# Patient Record
Sex: MALE | Race: WHITE | Employment: OTHER | URBAN - METROPOLITAN AREA
[De-identification: names, ages, dates, MRNs, and addresses within clinical notes are randomized per-mention and may not be internally consistent; named-entity substitution may affect disease eponyms.]

---

## 2018-06-04 ENCOUNTER — TELEPHONE (OUTPATIENT)
Dept: FAMILY MEDICINE CLINIC | Age: 73
End: 2018-06-04

## 2018-06-04 DIAGNOSIS — G47.33 OSA (OBSTRUCTIVE SLEEP APNEA): Primary | ICD-10-CM

## 2018-06-28 ENCOUNTER — TELEPHONE (OUTPATIENT)
Dept: FAMILY MEDICINE CLINIC | Age: 73
End: 2018-06-28

## 2018-07-02 DIAGNOSIS — G47.33 OSA (OBSTRUCTIVE SLEEP APNEA): ICD-10-CM

## 2018-08-14 ENCOUNTER — TELEPHONE (OUTPATIENT)
Dept: FAMILY MEDICINE CLINIC | Age: 73
End: 2018-08-14

## 2018-08-14 DIAGNOSIS — R73.09 ELEVATED GLUCOSE: ICD-10-CM

## 2018-08-14 DIAGNOSIS — E78.2 MIXED HYPERLIPIDEMIA: Primary | ICD-10-CM

## 2018-08-15 NOTE — TELEPHONE ENCOUNTER
Patient notified and verbalized understanding.   Faxed to [facility] per patient request.

## 2018-08-16 ENCOUNTER — TELEPHONE (OUTPATIENT)
Dept: FAMILY MEDICINE CLINIC | Age: 73
End: 2018-08-16

## 2018-08-16 DIAGNOSIS — R97.20 ELEVATED PSA: Primary | ICD-10-CM

## 2018-08-16 NOTE — TELEPHONE ENCOUNTER
Patient called again today about having his PSA checked with his blood work. I didn't see it in his chart.

## 2018-08-17 NOTE — TELEPHONE ENCOUNTER
Patient states it's been 6 months to a year. If you could add it he would appreciate it since he will be having labs done already.

## 2018-08-21 LAB
ALBUMIN SERPL-MCNC: 4 G/DL
ALP BLD-CCNC: 69 U/L
ALT SERPL-CCNC: 18 U/L
ANION GAP SERPL CALCULATED.3IONS-SCNC: 1.5 MMOL/L
AST SERPL-CCNC: 24 U/L
BASOPHILS ABSOLUTE: 0.1 /ΜL
BASOPHILS RELATIVE PERCENT: 1 %
BILIRUB SERPL-MCNC: 0.9 MG/DL (ref 0.1–1.4)
BUN BLDV-MCNC: 13 MG/DL
CALCIUM SERPL-MCNC: 9.1 MG/DL
CHLORIDE BLD-SCNC: 102 MMOL/L
CHOLESTEROL, TOTAL: 157 MG/DL
CHOLESTEROL/HDL RATIO: 2.6
CO2: 26 MMOL/L
CREAT SERPL-MCNC: 0.9 MG/DL
EOSINOPHILS ABSOLUTE: 0.3 /ΜL
EOSINOPHILS RELATIVE PERCENT: 5.5 %
GFR CALCULATED: NORMAL
GLUCOSE BLD-MCNC: 98 MG/DL
HCT VFR BLD CALC: 48.2 % (ref 41–53)
HDLC SERPL-MCNC: 40 MG/DL (ref 35–70)
HEMOGLOBIN: 16.2 G/DL (ref 13.5–17.5)
LDL CHOLESTEROL CALCULATED: 104 MG/DL (ref 0–160)
LYMPHOCYTES ABSOLUTE: 1.2 /ΜL
LYMPHOCYTES RELATIVE PERCENT: 21.4 %
MCH RBC QN AUTO: 29.2 PG
MCHC RBC AUTO-ENTMCNC: 33.5 G/DL
MCV RBC AUTO: 87 FL
MONOCYTES ABSOLUTE: 0.5 /ΜL
MONOCYTES RELATIVE PERCENT: 9.3 %
NEUTROPHILS ABSOLUTE: 3.6 /ΜL
NEUTROPHILS RELATIVE PERCENT: 62.8 %
PDW BLD-RTO: 13.9 %
PLATELET # BLD: 143 K/ΜL
PMV BLD AUTO: 8.3 FL
POTASSIUM SERPL-SCNC: 3.9 MMOL/L
RBC # BLD: 5.54 10^6/ΜL
SODIUM BLD-SCNC: 1.36 MMOL/L
TOTAL PROTEIN: 6.6
TRIGL SERPL-MCNC: 73 MG/DL
VLDLC SERPL CALC-MCNC: ABNORMAL MG/DL
WBC # BLD: 5.8 10^3/ML

## 2018-08-23 DIAGNOSIS — E78.2 MIXED HYPERLIPIDEMIA: ICD-10-CM

## 2018-08-23 DIAGNOSIS — R73.09 ELEVATED GLUCOSE: ICD-10-CM

## 2018-09-05 ENCOUNTER — OFFICE VISIT (OUTPATIENT)
Dept: FAMILY MEDICINE CLINIC | Age: 73
End: 2018-09-05
Payer: MEDICARE

## 2018-09-05 VITALS
BODY MASS INDEX: 23.91 KG/M2 | HEIGHT: 70 IN | OXYGEN SATURATION: 98 % | SYSTOLIC BLOOD PRESSURE: 95 MMHG | HEART RATE: 66 BPM | DIASTOLIC BLOOD PRESSURE: 56 MMHG | RESPIRATION RATE: 16 BRPM | WEIGHT: 167 LBS

## 2018-09-05 DIAGNOSIS — Z23 NEED FOR VACCINATION WITH 13-POLYVALENT PNEUMOCOCCAL CONJUGATE VACCINE: ICD-10-CM

## 2018-09-05 DIAGNOSIS — I15.9 SECONDARY HYPERTENSION: ICD-10-CM

## 2018-09-05 DIAGNOSIS — N40.1 BPH ASSOCIATED WITH NOCTURIA: ICD-10-CM

## 2018-09-05 DIAGNOSIS — Z00.00 MEDICARE ANNUAL WELLNESS VISIT, SUBSEQUENT: Primary | ICD-10-CM

## 2018-09-05 DIAGNOSIS — L40.9 PSORIASIS: ICD-10-CM

## 2018-09-05 DIAGNOSIS — D48.7 NEOPLASM OF UNCERTAIN BEHAVIOR OF BACK: ICD-10-CM

## 2018-09-05 DIAGNOSIS — R97.20 ELEVATED PSA: ICD-10-CM

## 2018-09-05 DIAGNOSIS — R35.1 BPH ASSOCIATED WITH NOCTURIA: ICD-10-CM

## 2018-09-05 DIAGNOSIS — R15.9 INCONTINENCE OF FECES, UNSPECIFIED FECAL INCONTINENCE TYPE: ICD-10-CM

## 2018-09-05 DIAGNOSIS — Z00.00 ROUTINE GENERAL MEDICAL EXAMINATION AT A HEALTH CARE FACILITY: ICD-10-CM

## 2018-09-05 DIAGNOSIS — Z23 NEED FOR VACCINATION FOR ZOSTER: ICD-10-CM

## 2018-09-05 PROCEDURE — G0438 PPPS, INITIAL VISIT: HCPCS | Performed by: FAMILY MEDICINE

## 2018-09-05 PROCEDURE — 4040F PNEUMOC VAC/ADMIN/RCVD: CPT | Performed by: FAMILY MEDICINE

## 2018-09-05 PROCEDURE — G0009 ADMIN PNEUMOCOCCAL VACCINE: HCPCS | Performed by: FAMILY MEDICINE

## 2018-09-05 PROCEDURE — 90670 PCV13 VACCINE IM: CPT | Performed by: FAMILY MEDICINE

## 2018-09-05 RX ORDER — CLOBETASOL PROPIONATE 0.5 MG/G
CREAM TOPICAL
Qty: 45 G | Refills: 1 | Status: SHIPPED | OUTPATIENT
Start: 2018-09-05 | End: 2018-09-19

## 2018-09-05 RX ORDER — TAMSULOSIN HYDROCHLORIDE 0.4 MG/1
CAPSULE ORAL
COMMUNITY
Start: 2018-05-30 | End: 2020-08-26 | Stop reason: ALTCHOICE

## 2018-09-05 ASSESSMENT — PATIENT HEALTH QUESTIONNAIRE - PHQ9
SUM OF ALL RESPONSES TO PHQ QUESTIONS 1-9: 0
SUM OF ALL RESPONSES TO PHQ QUESTIONS 1-9: 0

## 2018-09-05 ASSESSMENT — ANXIETY QUESTIONNAIRES: GAD7 TOTAL SCORE: 0

## 2018-09-05 NOTE — PATIENT INSTRUCTIONS
Patient Education        Kegel Exercises: Care Instructions  Your Care Instructions    Kegel exercises strengthen muscles around the bladder. These muscles control the flow of urine. Kegel exercises are sometime called \"pelvic floor\" exercises. They can help prevent urine leakage and keep the pelvic organs in place. A woman who just had a baby might want to try Kegel exercises. They can strengthen pelvic muscles that have been weakened by pregnancy and childbirth. A man or woman may use Kegel exercises to treat urine leakage. You do Kegel exercises by tightening the muscles you use when you urinate. You will likely need to do these exercises for several weeks to get better. Follow-up care is a key part of your treatment and safety. Be sure to make and go to all appointments, and call your doctor if you are having problems. It's also a good idea to know your test results and keep a list of the medicines you take. How can you care for yourself at home? · Do Kegel exercises. ¨ Find the muscles you need to strengthen. To do this, tighten the muscles that stop your urine while you are going to the bathroom. These are the same muscles you squeeze during Kegel exercises. ¨ Squeeze the muscles as hard as you can. Your belly and thighs should not move. ¨ Hold the squeeze for 3 seconds. Then relax for 3 seconds. ¨ Start with 3 seconds, and then add 1 second each week until you are able to squeeze for 10 seconds. ¨ Repeat the exercise 10 to 15 times for each session. Do three or more sessions each day. · You can check to see if you are using the right muscles. Place a finger in your vagina and squeeze around it. You are doing them right when you feel pressure around your finger. Your doctor may also suggest that you put special weights in your vagina while you do the exercises. · Do not smoke. It can irritate the bladder. If you need help quitting, talk to your doctor about stop-smoking programs and medicines. every 1-2 years to screen for glaucoma; cataracts, macular degeneration, and other eye disorders. · A preventive dental visit is recommended every 6 months. · Try to get at least 150 minutes of exercise per week or 10,000 steps per day on a pedometer . · Order or download the FREE \"Exercise & Physical Activity: Your Everyday Guide\" from [employer]. Call 6-413.606.8493 or search [employer] online. · You need 8454-8532 mg of calcium and 3797-6005 IU of vitamin D per day. It is possible to meet your calcium requirement with diet alone, but a vitamin D supplement is usually necessary to meet this goal.  · When exposed to the sun, use a sunscreen that protects against both UVA and UVB radiation with an SPF of 30 or greater. Reapply every 2 to 3 hours or after sweating, drying off with a towel, or swimming. · Always wear a seat belt when traveling in a car. Always wear a helmet when riding a bicycle or motorcycle.

## 2018-09-05 NOTE — PROGRESS NOTES
Medicare Annual Wellness Visit  Name: Bree Nolan Date: 2018   MRN: <[id number]> Sex: Male   Age: 67 y.o. Ethnicity: Non-/Non    : 1945 Race: Meliza Irby is here for Annual Exam    Screenings for behavioral, psychosocial and functional/safety risks, and cognitive dysfunction are all negative except as indicated below. These results, as well as other patient data from the [address] form, are documented in Flowsheets linked to this Encounter. Interval history  Elevated PSA  No new issues  Being followed by urology, had negative biopsies x 2. Watchful waiting is being elected. Has ongoing urgency and very rare incontinence. Was started on Myrbetriq. Fecal incontinence  New complaint  Had 3 episodes in past 1 year. Admits to feeling of a \"looser\" sensation in his rectum since his last colonoscopy. BM's are typically formed, soft. No constipation or diarrhea. No blood  No abd pain    Hypertension:  Patient is here for follow up chronic hypertension. This is a Chronic problem. Present for  years  This is  generally controlled on current medication regimen. BP today is BP: (!) 95/56   Takes meds as directed and tolerates them well. Patient is  compliant with lifestyle modifications. Patient does not smoke. No Known Allergies  Prior to Visit Medications    Medication Sig Taking? Authorizing Provider   tamsulosin (FLOMAX) 0.4 MG capsule  Yes Historical Provider, MD   quinapril (ACCUPRIL) 40 MG tablet TAKE ONE TABLET BY MOUTH DAILY Yes Sera Santiago MD   Tadalafil (CIALIS) 2.5 MG TABS Take 1 tablet by mouth daily Yes Sera Santiago MD   Ginkgo Biloba 40 MG TABS Take  by mouth. Yes Historical Provider, MD   multivitamin [facility]) per tablet Take 1 tablet by mouth daily. Yes Historical Provider, MD IJM DICKEY by Does not apply route.  Yes Historical Provider, MD   Omega-3 Fatty Acids (FISH OIL) 1000 MG CAPS Take 2 Inject 0.5 mLs into the muscle once for 1 dose Repeat dose in 2 months    Incontinence of feces, unspecified fecal incontinence type  -     External Referral To General Surgery    Neoplasm of uncertain behavior of back  -     External Referral To Dermatology    Elevated PSA    BPH associated with nocturia    Secondary hypertension    Psoriasis  -     clobetasol (TEMOVATE) 0.05 % cream; Apply topically 2 times daily.

## 2018-09-10 ASSESSMENT — LIFESTYLE VARIABLES
HOW MANY STANDARD DRINKS CONTAINING ALCOHOL DO YOU HAVE ON A TYPICAL DAY: 0
HOW OFTEN DURING THE LAST YEAR HAVE YOU FOUND THAT YOU WERE NOT ABLE TO STOP DRINKING ONCE YOU HAD STARTED: 0
AUDIT-C TOTAL SCORE: 1
HOW OFTEN DO YOU HAVE A DRINK CONTAINING ALCOHOL: 1
HOW OFTEN DURING THE LAST YEAR HAVE YOU HAD A FEELING OF GUILT OR REMORSE AFTER DRINKING: 0
HOW OFTEN DURING THE LAST YEAR HAVE YOU BEEN UNABLE TO REMEMBER WHAT HAPPENED THE NIGHT BEFORE BECAUSE YOU HAD BEEN DRINKING: 0
HAS A RELATIVE, FRIEND, DOCTOR, OR ANOTHER HEALTH PROFESSIONAL EXPRESSED CONCERN ABOUT YOUR DRINKING OR SUGGESTED YOU CUT DOWN: 0
HOW OFTEN DURING THE LAST YEAR HAVE YOU NEEDED AN ALCOHOLIC DRINK FIRST THING IN THE MORNING TO GET YOURSELF GOING AFTER A NIGHT OF HEAVY DRINKING: 0
HOW OFTEN DO YOU HAVE SIX OR MORE DRINKS ON ONE OCCASION: 0
AUDIT TOTAL SCORE: 1
HOW OFTEN DURING THE LAST YEAR HAVE YOU FAILED TO DO WHAT WAS NORMALLY EXPECTED FROM YOU BECAUSE OF DRINKING: 0
HAVE YOU OR SOMEONE ELSE BEEN INJURED AS A RESULT OF YOUR DRINKING: 0

## 2018-10-31 ENCOUNTER — TELEPHONE (OUTPATIENT)
Dept: FAMILY MEDICINE CLINIC | Age: 73
End: 2018-10-31

## 2018-10-31 DIAGNOSIS — R10.9 FLANK PAIN: Primary | ICD-10-CM

## 2018-11-01 LAB — MICROSCOPIC URINE: NORMAL

## 2018-11-02 DIAGNOSIS — R10.9 FLANK PAIN: ICD-10-CM

## 2018-11-05 ENCOUNTER — OFFICE VISIT (OUTPATIENT)
Dept: FAMILY MEDICINE CLINIC | Age: 73
End: 2018-11-05
Payer: MEDICARE

## 2018-11-05 VITALS
BODY MASS INDEX: 23.62 KG/M2 | DIASTOLIC BLOOD PRESSURE: 80 MMHG | WEIGHT: 165 LBS | RESPIRATION RATE: 16 BRPM | HEIGHT: 70 IN | HEART RATE: 66 BPM | SYSTOLIC BLOOD PRESSURE: 129 MMHG | OXYGEN SATURATION: 97 %

## 2018-11-05 DIAGNOSIS — R10.9 FLANK PAIN: Primary | ICD-10-CM

## 2018-11-05 PROCEDURE — G8427 DOCREV CUR MEDS BY ELIG CLIN: HCPCS | Performed by: FAMILY MEDICINE

## 2018-11-05 PROCEDURE — 3017F COLORECTAL CA SCREEN DOC REV: CPT | Performed by: FAMILY MEDICINE

## 2018-11-05 PROCEDURE — G8484 FLU IMMUNIZE NO ADMIN: HCPCS | Performed by: FAMILY MEDICINE

## 2018-11-05 PROCEDURE — 4040F PNEUMOC VAC/ADMIN/RCVD: CPT | Performed by: FAMILY MEDICINE

## 2018-11-05 PROCEDURE — 1101F PT FALLS ASSESS-DOCD LE1/YR: CPT | Performed by: FAMILY MEDICINE

## 2018-11-05 PROCEDURE — 1123F ACP DISCUSS/DSCN MKR DOCD: CPT | Performed by: FAMILY MEDICINE

## 2018-11-05 PROCEDURE — 1036F TOBACCO NON-USER: CPT | Performed by: FAMILY MEDICINE

## 2018-11-05 PROCEDURE — G8420 CALC BMI NORM PARAMETERS: HCPCS | Performed by: FAMILY MEDICINE

## 2018-11-05 PROCEDURE — 99213 OFFICE O/P EST LOW 20 MIN: CPT | Performed by: FAMILY MEDICINE

## 2018-11-05 ASSESSMENT — ENCOUNTER SYMPTOMS
CONSTIPATION: 0
VOMITING: 0
SHORTNESS OF BREATH: 0
ABDOMINAL PAIN: 0
DIARRHEA: 0
BLOOD IN STOOL: 0
COUGH: 0
NAUSEA: 0

## 2018-11-05 NOTE — PROGRESS NOTES
Subjective:    Foster Jordan is here today for left sided flank pain for the past 7-10 days. It was worse on Friday and today it is 1/10. He has had similar pain in the past but this is a little different. He had a U[facility] done at the [facility] and it was WNL. His pain is significantly better than it was. ROS: Otherwise negative    Patient Active Problem List   Diagnosis    Elevated PSA    Mitral valve disorder    BPH associated with nocturia    Hypertension       Past medical, surgical, family and social history were reviewed, non-contributory, and unchanged unless otherwise stated. Objective:    /80   Pulse 66   Resp 16   Ht 5' 10\" (1.778 m)   Wt 165 lb (74.8 kg)   SpO2 97%   BMI 23.68 kg/m²     Exam is as noted by resident with the following changes, additions or corrections:      Assessment/Plan:        Foster Jordan was seen today for flank pain. Diagnoses and all orders for this visit:    Flank pain           Attending Physician Statement    I have reviewed the chart, including any radiology or labs. I have discussed the case, including pertinent history and exam findings with the resident. I agree with the assessment, plan and orders as documented by the resident. Please refer to the resident note for additional information.       Electronically signed by Pritesh Rodriguez DO on 11/6/2018 at 5:28 PM
Cigarettes     Quit date: 1/1/1980    Smokeless tobacco: Never Used    Alcohol use No       Immunization History   Administered Date(s) Administered    Influenza, Triv, 3 Years and older, IM, PF (Afluria 5yrs and older) 10/15/2016    Pneumococcal 13-valent Conjugate ([id number]) 09/05/2018    Pneumococcal Polysaccharide ([id number]) 03/05/2013    Zoster Live (Zostavax) 09/04/2012       Review of Systems   Constitutional: Negative for chills and fever. Respiratory: Negative for cough and shortness of breath. Cardiovascular: Negative for chest pain, palpitations and leg swelling. Gastrointestinal: Negative for abdominal pain, blood in stool, constipation, diarrhea, nausea and vomiting. Genitourinary: Negative for dysuria and hematuria. Neurological: Negative for dizziness and headaches. VS:  /80   Pulse 66   Resp 16   Ht 5' 10\" (1.778 m)   Wt 165 lb (74.8 kg)   SpO2 97%   BMI 23.68 kg/m²     Physical Exam   Constitutional: He is oriented to person, place, and time. He appears well-developed and well-nourished. HENT:   Head: Normocephalic and atraumatic. Cardiovascular: Normal rate and regular rhythm. No murmur heard. Pulmonary/Chest: Effort normal and breath sounds normal. No respiratory distress. He has no wheezes. Abdominal: Soft. Normal appearance and bowel sounds are normal. He exhibits no distension. There is no hepatosplenomegaly. There is no tenderness. Musculoskeletal: Normal range of motion. No CVA tenderness. No spinal tenderness. No paraspinal tenderness. Neurological: He is alert and oriented to person, place, and time. Skin: Skin is warm and dry. Assessment/Plan:  1. Flank pain  Significantly improved. Patient has no history of renal calculi and denies passing any stones. Urinary work up has been fine. Unclear etiology, most likely muscular sprain. If reoccurs, consider imaging at that time. Provided reassurance.     Follow up: prn with

## 2019-01-14 DIAGNOSIS — I10 ESSENTIAL HYPERTENSION: ICD-10-CM

## 2019-01-14 RX ORDER — QUINAPRIL 40 MG/1
TABLET ORAL
Qty: 90 TABLET | Refills: 3 | Status: SHIPPED
Start: 2019-01-14 | End: 2020-11-04

## 2019-01-22 ENCOUNTER — OFFICE VISIT (OUTPATIENT)
Dept: FAMILY MEDICINE CLINIC | Age: 74
End: 2019-01-22
Payer: MEDICARE

## 2019-01-22 VITALS
RESPIRATION RATE: 16 BRPM | WEIGHT: 167 LBS | SYSTOLIC BLOOD PRESSURE: 122 MMHG | HEIGHT: 70 IN | DIASTOLIC BLOOD PRESSURE: 77 MMHG | OXYGEN SATURATION: 96 % | HEART RATE: 88 BPM | BODY MASS INDEX: 23.91 KG/M2

## 2019-01-22 DIAGNOSIS — J98.4 RESTRICTIVE LUNG DISEASE: ICD-10-CM

## 2019-01-22 DIAGNOSIS — R06.2 WHEEZING: Primary | ICD-10-CM

## 2019-01-22 LAB
EXPIRATORY TIME: NORMAL SEC
FEF 25-75% %PRED-PRE: NORMAL L/SEC
FEF 25-75% PRED: NORMAL L/SEC
FEF 25-75%-PRE: NORMAL L/SEC
FEV1 %PRED-PRE: NORMAL %
FEV1 PRED: NORMAL L
FEV1/FVC %PRED-PRE: NORMAL %
FEV1/FVC PRED: NORMAL %
FEV1/FVC: 99 %
FEV1: NORMAL L
FVC %PRED-PRE: NORMAL %
FVC PRED: NORMAL L
FVC: NORMAL L
PEF %PRED-PRE: NORMAL L/SEC
PEF PRED: NORMAL L/SEC
PEF-PRE: NORMAL L/SEC

## 2019-01-22 PROCEDURE — 99214 OFFICE O/P EST MOD 30 MIN: CPT | Performed by: FAMILY MEDICINE

## 2019-01-22 PROCEDURE — 94010 BREATHING CAPACITY TEST: CPT | Performed by: FAMILY MEDICINE

## 2019-01-22 PROCEDURE — G8420 CALC BMI NORM PARAMETERS: HCPCS | Performed by: FAMILY MEDICINE

## 2019-01-22 PROCEDURE — G8427 DOCREV CUR MEDS BY ELIG CLIN: HCPCS | Performed by: FAMILY MEDICINE

## 2019-01-22 PROCEDURE — 1123F ACP DISCUSS/DSCN MKR DOCD: CPT | Performed by: FAMILY MEDICINE

## 2019-01-22 PROCEDURE — 3017F COLORECTAL CA SCREEN DOC REV: CPT | Performed by: FAMILY MEDICINE

## 2019-01-22 PROCEDURE — G8484 FLU IMMUNIZE NO ADMIN: HCPCS | Performed by: FAMILY MEDICINE

## 2019-01-22 PROCEDURE — 1101F PT FALLS ASSESS-DOCD LE1/YR: CPT | Performed by: FAMILY MEDICINE

## 2019-01-22 PROCEDURE — 4040F PNEUMOC VAC/ADMIN/RCVD: CPT | Performed by: FAMILY MEDICINE

## 2019-01-22 PROCEDURE — 1036F TOBACCO NON-USER: CPT | Performed by: FAMILY MEDICINE

## 2019-01-22 RX ORDER — ALBUTEROL SULFATE 90 UG/1
2 AEROSOL, METERED RESPIRATORY (INHALATION) EVERY 6 HOURS PRN
Qty: 1 INHALER | Refills: 5 | Status: SHIPPED | OUTPATIENT
Start: 2019-01-22 | End: 2019-09-11 | Stop reason: SDUPTHER

## 2019-01-22 RX ORDER — BUDESONIDE AND FORMOTEROL FUMARATE DIHYDRATE 80; 4.5 UG/1; UG/1
2 AEROSOL RESPIRATORY (INHALATION) 2 TIMES DAILY
Qty: 1 INHALER | Refills: 5 | Status: SHIPPED | OUTPATIENT
Start: 2019-01-22 | End: 2019-09-11 | Stop reason: SDUPTHER

## 2019-01-22 ASSESSMENT — ENCOUNTER SYMPTOMS
TROUBLE SWALLOWING: 0
VOICE CHANGE: 0
ABDOMINAL PAIN: 0

## 2019-01-22 ASSESSMENT — PULMONARY FUNCTION TESTS: FEV1/FVC: 99

## 2019-02-01 DIAGNOSIS — R06.2 WHEEZING: ICD-10-CM

## 2019-02-01 PROCEDURE — 71046 X-RAY EXAM CHEST 2 VIEWS: CPT | Performed by: FAMILY MEDICINE

## 2019-02-11 LAB
PROSTATE SPECIFIC ANTIGEN FREE: 0.75 NG/ML
PROSTATE SPECIFIC ANTIGEN PERCENT FREE: 9 %
PROSTATE SPECIFIC ANTIGEN: 8.3 NG/ML
SODIUM BLD-SCNC: NORMAL MMOL/L

## 2019-09-11 ENCOUNTER — OFFICE VISIT (OUTPATIENT)
Dept: FAMILY MEDICINE CLINIC | Age: 74
End: 2019-09-11
Payer: MEDICARE

## 2019-09-11 VITALS
HEART RATE: 67 BPM | RESPIRATION RATE: 16 BRPM | SYSTOLIC BLOOD PRESSURE: 105 MMHG | OXYGEN SATURATION: 97 % | DIASTOLIC BLOOD PRESSURE: 64 MMHG | HEIGHT: 70 IN | BODY MASS INDEX: 24.34 KG/M2 | WEIGHT: 170 LBS

## 2019-09-11 DIAGNOSIS — J98.4 RESTRICTIVE LUNG DISEASE: ICD-10-CM

## 2019-09-11 DIAGNOSIS — R06.2 WHEEZING: ICD-10-CM

## 2019-09-11 DIAGNOSIS — Z00.00 ROUTINE GENERAL MEDICAL EXAMINATION AT A HEALTH CARE FACILITY: ICD-10-CM

## 2019-09-11 DIAGNOSIS — J45.909 REACTIVE AIRWAY DISEASE WITHOUT COMPLICATION, UNSPECIFIED ASTHMA SEVERITY, UNSPECIFIED WHETHER PERSISTENT: ICD-10-CM

## 2019-09-11 DIAGNOSIS — Z13.6 SCREENING FOR AAA (ABDOMINAL AORTIC ANEURYSM): ICD-10-CM

## 2019-09-11 DIAGNOSIS — I10 ESSENTIAL HYPERTENSION: ICD-10-CM

## 2019-09-11 DIAGNOSIS — Z00.00 MEDICARE ANNUAL WELLNESS VISIT, SUBSEQUENT: Primary | ICD-10-CM

## 2019-09-11 PROCEDURE — 3017F COLORECTAL CA SCREEN DOC REV: CPT | Performed by: FAMILY MEDICINE

## 2019-09-11 PROCEDURE — G0439 PPPS, SUBSEQ VISIT: HCPCS | Performed by: FAMILY MEDICINE

## 2019-09-11 PROCEDURE — 4040F PNEUMOC VAC/ADMIN/RCVD: CPT | Performed by: FAMILY MEDICINE

## 2019-09-11 PROCEDURE — 1123F ACP DISCUSS/DSCN MKR DOCD: CPT | Performed by: FAMILY MEDICINE

## 2019-09-11 RX ORDER — IBUPROFEN 400 MG/1
TABLET ORAL
COMMUNITY
End: 2020-08-26 | Stop reason: ALTCHOICE

## 2019-09-11 RX ORDER — ASPIRIN 81 MG
TABLET, DELAYED RELEASE (ENTERIC COATED) ORAL
COMMUNITY

## 2019-09-11 RX ORDER — ALBUTEROL SULFATE 90 UG/1
2 AEROSOL, METERED RESPIRATORY (INHALATION) EVERY 6 HOURS PRN
Qty: 1 INHALER | Refills: 5 | Status: SHIPPED
Start: 2019-09-11 | End: 2020-08-26 | Stop reason: ALTCHOICE

## 2019-09-11 RX ORDER — BUDESONIDE AND FORMOTEROL FUMARATE DIHYDRATE 80; 4.5 UG/1; UG/1
2 AEROSOL RESPIRATORY (INHALATION) 2 TIMES DAILY
Qty: 1 INHALER | Refills: 5 | Status: SHIPPED
Start: 2019-09-11 | End: 2020-10-01

## 2019-09-11 ASSESSMENT — LIFESTYLE VARIABLES
AUDIT-C TOTAL SCORE: 1
HOW OFTEN DURING THE LAST YEAR HAVE YOU FOUND THAT YOU WERE NOT ABLE TO STOP DRINKING ONCE YOU HAD STARTED: 0
HOW OFTEN DURING THE LAST YEAR HAVE YOU HAD A FEELING OF GUILT OR REMORSE AFTER DRINKING: 0
HAVE YOU OR SOMEONE ELSE BEEN INJURED AS A RESULT OF YOUR DRINKING: 0
HAS A RELATIVE, FRIEND, DOCTOR, OR ANOTHER HEALTH PROFESSIONAL EXPRESSED CONCERN ABOUT YOUR DRINKING OR SUGGESTED YOU CUT DOWN: 0
HOW MANY STANDARD DRINKS CONTAINING ALCOHOL DO YOU HAVE ON A TYPICAL DAY: 0
HOW OFTEN DURING THE LAST YEAR HAVE YOU NEEDED AN ALCOHOLIC DRINK FIRST THING IN THE MORNING TO GET YOURSELF GOING AFTER A NIGHT OF HEAVY DRINKING: 0
HOW OFTEN DO YOU HAVE A DRINK CONTAINING ALCOHOL: 1
HOW OFTEN DO YOU HAVE SIX OR MORE DRINKS ON ONE OCCASION: 0
AUDIT TOTAL SCORE: 1
HOW OFTEN DURING THE LAST YEAR HAVE YOU BEEN UNABLE TO REMEMBER WHAT HAPPENED THE NIGHT BEFORE BECAUSE YOU HAD BEEN DRINKING: 0
HOW OFTEN DURING THE LAST YEAR HAVE YOU FAILED TO DO WHAT WAS NORMALLY EXPECTED FROM YOU BECAUSE OF DRINKING: 0

## 2019-09-11 ASSESSMENT — PATIENT HEALTH QUESTIONNAIRE - PHQ9
SUM OF ALL RESPONSES TO PHQ QUESTIONS 1-9: 0
SUM OF ALL RESPONSES TO PHQ QUESTIONS 1-9: 0

## 2019-09-11 NOTE — PATIENT INSTRUCTIONS
Personalized Preventive Plan for Shea Crespo - 9/11/2019  Medicare offers a range of preventive health benefits. Some of the tests and screenings are paid in full while other may be subject to a deductible, co-insurance, and/or copay. Some of these benefits include a comprehensive review of your medical history including lifestyle, illnesses that may run in your family, and various assessments and screenings as appropriate. After reviewing your medical record and screening and assessments performed today your provider may have ordered immunizations, labs, imaging, and/or referrals for you. A list of these orders (if applicable) as well as your Preventive Care list are included within your After Visit Summary for your review. Other Preventive Recommendations:    · A preventive eye exam performed by an eye specialist is recommended every 1-2 years to screen for glaucoma; cataracts, macular degeneration, and other eye disorders. · A preventive dental visit is recommended every 6 months. · Try to get at least 150 minutes of exercise per week or 10,000 steps per day on a pedometer . · Order or download the FREE \"Exercise & Physical Activity: Your Everyday Guide\" from [employer]. Call 0-249.240.6584 or search [employer] [facility] [employer] [facility] [employer] online. · You need 4601-4136 mg of calcium and 1623-9813 IU of vitamin D per day. It is possible to meet your calcium requirement with diet alone, but a vitamin D supplement is usually necessary to meet this goal.  · When exposed to the sun, use a sunscreen that protects against both UVA and UVB radiation with an SPF of 30 or greater. Reapply every 2 to 3 hours or after sweating, drying off with a towel, or swimming. · Always wear a seat belt when traveling in a car. Always wear a helmet when riding a bicycle or motorcycle. Heart-Healthy Diet   Sodium, Fat, and Cholesterol Controlled Diet       What Is a Heart Healthy Diet?    A heart-healthy example, this would mean 60 grams of fat or less per day. Saturated fat and trans fat in your diet raises your blood cholesterol the most, much more than dietary cholesterol does. For this reason, on a heart-healthy diet, less than 7% of your calories should come from saturated fat and ideally 0% from trans fat. On an 1800-calorie diet, this translates into less than 14 grams of saturated fat per day, leaving 46 grams of fat to come from mono- and polyunsaturated fats.    Food Choices on a Heart Healthy Diet   Food Category   Foods Recommended   Foods to Avoid   Grains   Breads and rolls without salted tops Most dry and cooked cereals Unsalted crackers and breadsticks Low-sodium or homemade breadcrumbs or stuffing All rice and pastas   Breads, rolls, and crackers with salted tops High-fat baked goods (eg, muffins, donuts, pastries) Quick breads, self-rising flour, and biscuit mixes Regular bread crumbs Instant hot cereals Commercially prepared rice, pasta, or stuffing mixes   Vegetables   Most fresh, frozen, and low-sodium canned vegetables Low-sodium and salt-free vegetable juices Canned vegetables if unsalted or rinsed   Regular canned vegetables and juices, including sauerkraut and pickled vegetables Frozen vegetables with sauces Commercially prepared potato and vegetable mixes   Fruits   Most fresh, frozen, and canned fruits All fruit juices   Fruits processed with salt or sodium   Milk   Nonfat or low-fat (1%) milk Nonfat or low-fat yogurt Cottage cheese, low-fat ricotta, cheeses labeled as low-fat and low-sodium   Whole milk Reduced-fat (2%) milk Malted and chocolate milk Full fat yogurt Most cheeses (unless low-fat and low salt) Buttermilk (no more than 1 cup per week)   Meats and Beans   Lean cuts of fresh or frozen beef, veal, lamb, or pork (look for the word loin) Fresh or frozen poultry without the skin Fresh or frozen fish and some shellfish Egg whites and egg substitutes (Limit whole eggs to three per week) Tofu Nuts or seeds (unsalted, dry-roasted), low-sodium peanut butter Dried peas, beans, and lentils   Any smoked, cured, salted, or canned meat, fish, or poultry (including burton, chipped beef, cold cuts, hot dogs, sausages, sardines, and anchovies) Poultry skins Breaded and/or fried fish or meats Canned peas, beans, and lentils Salted nuts   Fats and Oils   Olive oil and canola oil Low-sodium, low-fat salad dressings and mayonnaise   Butter, margarine, coconut and palm oils, burton fat   Snacks, Sweets, and Condiments   Low-sodium or unsalted versions of broths, soups, soy sauce, and condiments Pepper, herbs, and spices; vinegar, lemon, or lime juice Low-fat frozen desserts (yogurt, sherbet, fruit bars) Sugar, cocoa powder, honey, syrup, jam, and preserves Low-fat, trans-fat free cookies, cakes, and pies Phu and animal crackers, fig bars, claudia snaps   High-fat desserts Broth, soups, gravies, and sauces, made from instant mixes or other high-sodium ingredients Salted snack foods Canned olives Meat tenderizers, seasoning salt, and most flavored vinegars   Beverages   Low-sodium carbonated beverages Tea and coffee in moderation Soy milk   Commercially softened water   Suggestions   Make whole grains, fruits, and vegetables the base of your diet. Choose heart-healthy fats such as canola, olive, and flaxseed oil, and foods high in heart-healthy fats, such as nuts, seeds, soybeans, tofu, and fish. Eat fish at least twice per week; the fish highest in omega-3 fatty acids and lowest in mercury include salmon, herring, mackerel, sardines, and canned chunk light tuna. If you eat fish less than twice per week or have high triglycerides, talk to your doctor about taking fish oil supplements. Read food labels.    For products low in fat and cholesterol, look for fat free, low-fat, cholesterol free, saturated fat free, and trans fat freeAlso scan the Nutrition Facts Label, which lists saturated fat, trans fat,

## 2019-09-11 NOTE — PROGRESS NOTES
exchange without accessory muscle use   No rales, rhonchi or wheeze  CV: S1S2 RRR   No murmur   Intact distal pulses   No edema  GI: Abdomen Soft, non tender, non distended   Normoactive bowel sounds   No palpable hepatosplenomegaly  MS: Physiologic ROM of all extremities    Intact distal pulses   No clubbing or cyanosis   Skin Warm and dry; no rash or lesion  Neuro: Alert and oriented; normal and intact dtr's   Psych: Euthymic mood, congruent affect       Patient's complete Health Risk Assessment and screening values have been reviewed and are found in Flowsheets. The following problems were reviewed today and where indicated follow up appointments were made and/or referrals ordered.     Positive Risk Factor Screenings with Interventions:     Hearing/Vision:  No exam data present  Hearing/Vision  Do you or your family notice any trouble with your hearing?: No  Do you have difficulty driving, watching TV, or doing any of your daily activities because of your eyesight?: No  Have you had an eye exam within the past year?: (!) No  Hearing/Vision Interventions:  · Vision concerns:  patient encouraged to make appointment with his/her eye specialist    Safety:  Safety  Do you have working smoke detectors?: Yes  Have all throw rugs been removed or fastened?: (!) No  Do you have non-slip mats or surfaces in all bathtubs/showers?: Yes  Do all of your stairways have a railing or banister?: Yes  Are your doorways, halls and stairs free of clutter?: Yes  Do you always fasten your seatbelt when you are in a car?: Yes  Safety Interventions:  · Home safety tips provided    Personalized Preventive Plan   Current Health Maintenance Status  Immunization History   Administered Date(s) Administered    Influenza, Triv, 3 Years and older, IM, PF (Afluria 5yrs and older) 10/15/2016    Pneumococcal Conjugate 13-valent (Latrelle Deo) 09/05/2018    Pneumococcal Polysaccharide ([id number]) 03/05/2013    Zoster Live (Zostavax) 09/04/2012 Health Maintenance   Topic Date Due    AAA screen  1945    Hepatitis C screen  1945    DTaP/Tdap/Td vaccine (1 - Tdap) 11/07/1964    Shingles Vaccine (2 of 3) 10/30/2012    Potassium monitoring  08/21/2019    Creatinine monitoring  08/21/2019    Flu vaccine (1) 09/01/2019    Annual Wellness Visit (AWV)  09/05/2019    Lipid screen  08/21/2023    Colon cancer screen colonoscopy  05/06/2029    Pneumococcal 65+ years Vaccine  Completed     Recommendations for Preventive Services Due: see orders and patient instructions/AVS.  . Recommended screening schedule for the next 5-10 years is provided to the patient in written form: see Patient Instructions/AVS.    There are no diagnoses linked to this encounter. Es Perez was seen today for medicare awv. Diagnoses and all orders for this visit:    Medicare annual wellness visit, subsequent    Wheezing  -     albuterol sulfate HFA (PROAIR HFA) 108 (90 Base) MCG/ACT inhaler; Inhale 2 puffs into the lungs every 6 hours as needed for Wheezing or Shortness of Breath  -     budesonide-formoterol (SYMBICORT) 80-4.5 MCG/ACT AERO; Inhale 2 puffs into the lungs 2 times daily Rinse mouth after use. Restrictive lung disease  -     albuterol sulfate HFA (PROAIR HFA) 108 (90 Base) MCG/ACT inhaler; Inhale 2 puffs into the lungs every 6 hours as needed for Wheezing or Shortness of Breath  -     budesonide-formoterol (SYMBICORT) 80-4.5 MCG/ACT AERO; Inhale 2 puffs into the lungs 2 times daily Rinse mouth after use. Reactive airway disease without complication, unspecified asthma severity, unspecified whether persistent    Essential hypertension  -     CBC Auto Differential; Future  -     Comprehensive Metabolic Panel; Future  -     Lipid Panel; Future    Screening for AAA (abdominal aortic aneurysm)  -     [place] screening for AAA; Future    Routine general medical examination at a health care facility    Plan  Overall issues are stable.   No new problems identified. Continue same meds and regimen  HM issues reviewed  See specialists as scheduled. Return in about 6 days (around 9/17/2019), or if symptoms worsen or fail to improve, for recheck on breathing and BP; see me for Medicare Annual Wellness Visit in 1 year.

## 2019-09-25 DIAGNOSIS — I10 ESSENTIAL HYPERTENSION: ICD-10-CM

## 2019-09-25 DIAGNOSIS — Z13.6 SCREENING FOR AAA (ABDOMINAL AORTIC ANEURYSM): ICD-10-CM

## 2019-09-25 LAB
ALBUMIN SERPL-MCNC: NORMAL G/DL
ALP BLD-CCNC: NORMAL U/L
ALT SERPL-CCNC: NORMAL U/L
ANION GAP SERPL CALCULATED.3IONS-SCNC: NORMAL MMOL/L
AST SERPL-CCNC: NORMAL U/L
BASOPHILS ABSOLUTE: NORMAL /ΜL
BASOPHILS RELATIVE PERCENT: NORMAL %
BILIRUB SERPL-MCNC: NORMAL MG/DL (ref 0.1–1.4)
BUN BLDV-MCNC: NORMAL MG/DL
CALCIUM SERPL-MCNC: NORMAL MG/DL
CHLORIDE BLD-SCNC: NORMAL MMOL/L
CHOLESTEROL, TOTAL: 161 MG/DL
CHOLESTEROL/HDL RATIO: 2.5
CO2: NORMAL MMOL/L
CREAT SERPL-MCNC: 1 MG/DL
EOSINOPHILS ABSOLUTE: NORMAL /ΜL
EOSINOPHILS RELATIVE PERCENT: NORMAL %
GFR CALCULATED: NORMAL
GLUCOSE BLD-MCNC: NORMAL MG/DL
HCT VFR BLD CALC: NORMAL % (ref 41–53)
HDLC SERPL-MCNC: 45 MG/DL (ref 35–70)
HEMOGLOBIN: NORMAL G/DL (ref 13.5–17.5)
LDL CHOLESTEROL CALCULATED: 112 MG/DL (ref 0–160)
LYMPHOCYTES ABSOLUTE: NORMAL /ΜL
LYMPHOCYTES RELATIVE PERCENT: NORMAL %
MCH RBC QN AUTO: NORMAL PG
MCHC RBC AUTO-ENTMCNC: NORMAL G/DL
MCV RBC AUTO: NORMAL FL
MONOCYTES ABSOLUTE: NORMAL /ΜL
MONOCYTES RELATIVE PERCENT: NORMAL %
NEUTROPHILS ABSOLUTE: NORMAL /ΜL
NEUTROPHILS RELATIVE PERCENT: NORMAL %
PDW BLD-RTO: NORMAL %
PLATELET # BLD: NORMAL K/ΜL
PMV BLD AUTO: NORMAL FL
POTASSIUM SERPL-SCNC: 3.8 MMOL/L
RBC # BLD: NORMAL 10^6/ΜL
SODIUM BLD-SCNC: NORMAL MMOL/L
TOTAL PROTEIN: NORMAL
TRIGL SERPL-MCNC: 84 MG/DL
VLDLC SERPL CALC-MCNC: NORMAL MG/DL
WBC # BLD: NORMAL 10^3/ML

## 2019-09-26 DIAGNOSIS — I10 ESSENTIAL HYPERTENSION: ICD-10-CM

## 2020-05-26 ENCOUNTER — OFFICE VISIT (OUTPATIENT)
Dept: FAMILY MEDICINE CLINIC | Age: 75
End: 2020-05-26
Payer: MEDICARE

## 2020-05-26 VITALS
TEMPERATURE: 98.8 F | SYSTOLIC BLOOD PRESSURE: 117 MMHG | RESPIRATION RATE: 16 BRPM | HEIGHT: 70 IN | HEART RATE: 60 BPM | DIASTOLIC BLOOD PRESSURE: 75 MMHG | BODY MASS INDEX: 24.05 KG/M2 | WEIGHT: 168 LBS | OXYGEN SATURATION: 97 %

## 2020-05-26 PROCEDURE — 3017F COLORECTAL CA SCREEN DOC REV: CPT | Performed by: FAMILY MEDICINE

## 2020-05-26 PROCEDURE — 99213 OFFICE O/P EST LOW 20 MIN: CPT | Performed by: FAMILY MEDICINE

## 2020-05-26 PROCEDURE — 1036F TOBACCO NON-USER: CPT | Performed by: FAMILY MEDICINE

## 2020-05-26 PROCEDURE — 4040F PNEUMOC VAC/ADMIN/RCVD: CPT | Performed by: FAMILY MEDICINE

## 2020-05-26 PROCEDURE — 1123F ACP DISCUSS/DSCN MKR DOCD: CPT | Performed by: FAMILY MEDICINE

## 2020-05-26 PROCEDURE — G8420 CALC BMI NORM PARAMETERS: HCPCS | Performed by: FAMILY MEDICINE

## 2020-05-26 PROCEDURE — G8427 DOCREV CUR MEDS BY ELIG CLIN: HCPCS | Performed by: FAMILY MEDICINE

## 2020-05-26 RX ORDER — OMEPRAZOLE 20 MG/1
20 CAPSULE, DELAYED RELEASE ORAL
Qty: 30 CAPSULE | Refills: 1 | Status: SHIPPED
Start: 2020-05-26 | End: 2020-08-18

## 2020-05-26 RX ORDER — FLUTICASONE PROPIONATE 50 MCG
1 SPRAY, SUSPENSION (ML) NASAL DAILY
Qty: 1 BOTTLE | Refills: 1 | Status: SHIPPED
Start: 2020-05-26 | End: 2022-07-20 | Stop reason: SDUPTHER

## 2020-05-26 ASSESSMENT — ENCOUNTER SYMPTOMS
SHORTNESS OF BREATH: 0
COUGH: 0
WHEEZING: 0

## 2020-05-26 ASSESSMENT — PATIENT HEALTH QUESTIONNAIRE - PHQ9
SUM OF ALL RESPONSES TO PHQ QUESTIONS 1-9: 0
1. LITTLE INTEREST OR PLEASURE IN DOING THINGS: 0
SUM OF ALL RESPONSES TO PHQ QUESTIONS 1-9: 0
SUM OF ALL RESPONSES TO PHQ9 QUESTIONS 1 & 2: 0
2. FEELING DOWN, DEPRESSED OR HOPELESS: 0

## 2020-05-26 NOTE — PROGRESS NOTES
CC   Chief Complaint   Patient presents with    Pharyngitis    Hoarse     HPI:   Patient comes in today for the below issue(s). Hoarseness of voice  New complaint  Present for at least 2 months if not longer  Gradual onset, no obvious precipitating event reported by patient  States has had a waxing and waning course, with overall trajectory of improvement but not resolution  Voice simply feels hoarse  Not noting any lymph node enlargement, palpable masses or areas of tenderness in his anterior neck. No increased nasal congestion rhinorrhea sinus pressure or postnasal drip sensation  Denies reflux indigestion or heartburn. No unintentional weight loss  No medication changes    Review of Systems  Review of Systems   Constitutional: Negative for appetite change, chills, diaphoresis and fever. HENT: Negative for dental problem and ear pain. Respiratory: Negative for cough, shortness of breath and wheezing. Cardiovascular: Negative for chest pain. Otherwise as above     Past Medical History:   Diagnosis Date    Dyspnea on exertion     chronic; notes mostly with stairs    Elevated PSA     Sees Dr Aislinn Cortes    Hypertension     Mitral valve disorder     MVP on echo 8/2011    Sciatica 9/15/2014    Seasonal allergies          PE:  VS:  /75   Pulse 60   Temp 98.8 °F (37.1 °C) (Temporal)   Resp 16   Ht 5' 10\" (1.778 m)   Wt 168 lb (76.2 kg)   SpO2 97%   BMI 24.11 kg/m²   Physical Exam  Vitals signs reviewed. Constitutional:       General: He is not in acute distress. Appearance: Normal appearance. HENT:      Head: Normocephalic and atraumatic. Right Ear: Tympanic membrane and ear canal normal.      Left Ear: Tympanic membrane and ear canal normal.      Nose: Congestion present. Mouth/Throat:      Mouth: Mucous membranes are moist.      Pharynx: No oropharyngeal exudate or posterior oropharyngeal erythema.      Neck:      Musculoskeletal: Full passive range of motion without pain. No muscular tenderness. Thyroid: No thyroid mass or thyromegaly. Trachea: Trachea normal.   Cardiovascular:      Rate and Rhythm: Normal rate and regular rhythm. Pulses: Normal pulses. Heart sounds: No murmur. Pulmonary:      Effort: Pulmonary effort is normal.      Breath sounds: No wheezing, rhonchi or rales. Abdominal:      General: Bowel sounds are normal. There is no distension. Tenderness: There is no abdominal tenderness. Lymphadenopathy:      Cervical: No cervical adenopathy. Upper Body:      Right upper body: No supraclavicular or axillary adenopathy. Left upper body: No supraclavicular or axillary adenopathy. Neurological:      Mental Status: He is alert. Assessment (including specific orders/meds/labs):      Kemi Felix was seen today for pharyngitis and hoarse. Diagnoses and all orders for this visit:    Hoarseness of voice  -     fluticasone (FLONASE) 50 MCG/ACT nasal spray; 1 spray by Each Nostril route daily  -     omeprazole (PRILOSEC) 20 MG delayed release capsule; Take 1 capsule by mouth every morning (before breakfast)        Plan:     Differential diagnosis reviewed with patient. In the absence of palpable masses or focal areas of tenderness, I feel this most likely represents posterior nasal drainage or silent reflux. We will treat for both with the above medications x1 month. Reassess then. If not improving will send to ENT for direct laryngoscopy. He declined offer of referral today. Will monitor symptoms and notify if worsen in the interim    Counseled regarding above diagnosis, including possible risks and complications,  especially if left uncontrolled. Counseled regarding the possible sideeffects, risks, benefits and alternatives to treatment; patient and/or guardian verbalizes understanding, agrees, feels comfortable with and wishes to proceed with above treatment plan.     Call or go to ED immediately if symptoms

## 2020-08-26 ENCOUNTER — OFFICE VISIT (OUTPATIENT)
Dept: FAMILY MEDICINE CLINIC | Age: 75
End: 2020-08-26
Payer: MEDICARE

## 2020-08-26 VITALS
RESPIRATION RATE: 16 BRPM | DIASTOLIC BLOOD PRESSURE: 78 MMHG | OXYGEN SATURATION: 97 % | SYSTOLIC BLOOD PRESSURE: 108 MMHG | WEIGHT: 170 LBS | TEMPERATURE: 98 F | BODY MASS INDEX: 24.34 KG/M2 | HEIGHT: 70 IN | HEART RATE: 97 BPM

## 2020-08-26 PROCEDURE — 3017F COLORECTAL CA SCREEN DOC REV: CPT | Performed by: FAMILY MEDICINE

## 2020-08-26 PROCEDURE — G8427 DOCREV CUR MEDS BY ELIG CLIN: HCPCS | Performed by: FAMILY MEDICINE

## 2020-08-26 PROCEDURE — 4040F PNEUMOC VAC/ADMIN/RCVD: CPT | Performed by: FAMILY MEDICINE

## 2020-08-26 PROCEDURE — 99213 OFFICE O/P EST LOW 20 MIN: CPT | Performed by: STUDENT IN AN ORGANIZED HEALTH CARE EDUCATION/TRAINING PROGRAM

## 2020-08-26 PROCEDURE — 1123F ACP DISCUSS/DSCN MKR DOCD: CPT | Performed by: FAMILY MEDICINE

## 2020-08-26 PROCEDURE — 1036F TOBACCO NON-USER: CPT | Performed by: FAMILY MEDICINE

## 2020-08-26 PROCEDURE — G8420 CALC BMI NORM PARAMETERS: HCPCS | Performed by: FAMILY MEDICINE

## 2020-08-26 ASSESSMENT — ENCOUNTER SYMPTOMS
COUGH: 0
ABDOMINAL PAIN: 0
RHINORRHEA: 0
SHORTNESS OF BREATH: 0
VOMITING: 0
NAUSEA: 0

## 2020-08-26 NOTE — PROGRESS NOTES
[facility]  Department of Family Medicine  Family Medicine Residency Program      Patient:  Christine Gee 76 y.o. male     Date of Service: 8/26/20      Chief complaint:   Chief Complaint   Patient presents with    Rash     to right flank area, right arm, and right side of back for the last week          History ofPresent Illness   The patient is a 76 y.o. male  here to follow up of their rash  Started last Wednesday  Has been getting progressively worse  Was wondering if it was a spider bite, some musclle soreness when it first started  Recent stress-remodeling bathroom (very expensive)  Not worried about the pain-does not hurt him too much unless something brushes up against it  Believes he had chicken pox as a child. States he did have one shingles shot  Recently started valtrex-called in by PCP  No sick contacts  Denies immunosuppression  No pregnant women close by         Past Medical History:      Diagnosis Date    Dyspnea on exertion     chronic; notes mostly with stairs    Elevated PSA     Sees Dr Ankush Ayala Hypertension     Mitral valve disorder     MVP on echo 8/2011    Sciatica 9/15/2014    Seasonal allergies        Review of Systems:   Review of Systems   Constitutional: Negative for chills and fever. HENT: Negative for congestion and rhinorrhea. Respiratory: Negative for cough and shortness of breath. Cardiovascular: Negative for chest pain and leg swelling. Gastrointestinal: Negative for abdominal pain, nausea and vomiting. Genitourinary: Negative for dysuria and hematuria. Musculoskeletal: Negative for arthralgias and myalgias. Skin: Positive for rash. Negative for wound. Neurological: Negative for dizziness and light-headedness.        Physical Exam   Vitals: /78   Pulse 97   Temp 98 °F (36.7 °C) (Temporal)   Resp 16   Ht 5' 10\" (1.778 m)   Wt 170 lb (77.1 kg)   SpO2 97%   BMI 24.39 kg/m²   Physical Exam    General:  Well developed, well nourished, well groomed. No apparent distress. HEENT:  Normocephalic, atraumatic. No scleral icterus. No conjunctival injection. No nasal discharge. Heart:  RRR, no murmurs, gallops, or rubs  Lungs:  CTA bilaterally, bilat symmetrical expansion, no wheeze, rales, or rhonchi  Abdomen: Bowel sounds present, soft, nontender, no masses, no organomegaly, no peritoneal signs  Extremities:  No clubbing, cyanosis, or edema  Neuro:  Alert and oriented x3, no focal deficits  Skin: Clustered vesicular lesion in a T3/T4 dermatomal pattern along the R back, scapula, flank, and chest with some scabbing near the axillary region      Assessment and Plan       1. Herpes zoster without complication  -Recently started on valtrex by pcp-can continue  -Discussed precautions and possibilities of transmission-advised to keep area covered. Warned against possibility of secondary infection  -Offered lidocaine patch or lyrica. Patient stated he was currently OK with the amount of pain he was having and would call the office and let us know if the pain worsened  -Tylenol and or motrin PRN  -To call office if lesions worsen or do not improve, otherwise keep regularly scheduled appointment with PCP      Counseled regarding above diagnosis, including possible risks and complications,  especially if left uncontrolled. Counseled regarding the possible side effects, risks, benefits and alternatives to treatment;patient and/or guardian verbalizes understanding, agrees, feels comfortable with and wishes to proceed with above treatment plan. Call or go to ED immediately if symptoms worsen or persist. Advised patient to call with any new medication issues, and, as applicable, read all Rx info from pharmacy to assure aware of all possible risks and side effects of medicationbefore taking. Patient and/or guardian given opportunity to ask questions/raise concerns. The patient verbalized comfort and understanding ofinstructions.     I encourage further reading and education about your health conditions. Information on many health conditions is provided by [facility] [employer]: [email or web address]  Please bring any questions to me at your nextvisit. Return to Office: Return if symptoms worsen or fail to improve. Medication List:    Current Outpatient Medications   Medication Sig Dispense Refill    valACYclovir (VALTREX) 1 g tablet Take 1 tablet by mouth 3 times daily for 7 days 21 tablet 0    omeprazole (PRILOSEC) 20 MG delayed release capsule TAKE 1 CAPSULE BY MOUTH ONCE DAILY IN THE MORNING BEFORE BREAKFAST 30 capsule 5    fluticasone (FLONASE) 50 MCG/ACT nasal spray 1 spray by Each Nostril route daily 1 Bottle 1    Multiple Vitamins-Minerals (MULTIVITAMIN-MINERALS) TABS tablet Take by mouth      Saw Palmetto, Serenoa repens, (SAW PALMETTO EXTRACT PO) Take by mouth      budesonide-formoterol (SYMBICORT) 80-4.5 MCG/ACT AERO Inhale 2 puffs into the lungs 2 times daily Rinse mouth after use. 1 Inhaler 5    quinapril (ACCUPRIL) 40 MG tablet TAKE ONE TABLET BY MOUTH DAILY 90 tablet 3    Ginkgo Biloba 40 MG TABS Take  by mouth.  SAW PALMETTO by Does not apply route. No current facility-administered medications for this visit. Jovani Drake MD     This document may have been prepared at least partially through the use of voice recognition software. Although effort is taken to assure the accuracy ofthis document, it is possible that grammatical, syntax,  or spelling errors may occur.

## 2020-08-26 NOTE — PROGRESS NOTES
S: 76 y.o. male with   Chief Complaint   Patient presents with    Rash     to right flank area, right arm, and right side of back for the last week        Pt with rash that is small blister on chest and back and under right arm. BP Readings from Last 3 Encounters:   08/26/20 108/78   05/26/20 117/75   09/11/19 105/64       O: VS:  height is 5' 10\" (1.778 m) and weight is 170 lb (77.1 kg). His temporal temperature is 98 °F (36.7 °C). His blood pressure is 108/78 and his pulse is 97. His respiration is 16 and oxygen saturation is 97%. As per resident note    Impression/Plan:   1) shingles - pt on valtrex. Will cont. Does not need any pain meds at this time. Pt ed. Health Maintenance Due   Topic Date Due    Hepatitis C screen  1945    DTaP/Tdap/Td vaccine (1 - Tdap) 11/07/1964    Shingles Vaccine (2 of 3) 10/30/2012    PSA counseling  02/11/2020    Annual Wellness Visit (AWV)  09/11/2020         Attending Physician Statement  I have discussed the case, including pertinent history and exam findings with the resident. I also have seen the patient and performed key portions of the examination. I agree with the documented assessment and plan.       Jessika Mayfield MD

## 2020-09-01 RX ORDER — VALACYCLOVIR HYDROCHLORIDE 1 G/1
1000 TABLET, FILM COATED ORAL 3 TIMES DAILY
Qty: 21 TABLET | Refills: 0 | OUTPATIENT
Start: 2020-09-01 | End: 2020-09-08

## 2020-09-01 NOTE — TELEPHONE ENCOUNTER
Treatment beyond 1 week does not change the outcome. He will likely still have some skin manifestations of the shingles that will take several weeks to resolve. However the purpose of the medication is to shorten the duration of viral shedding.

## 2020-10-03 ASSESSMENT — LIFESTYLE VARIABLES
HOW OFTEN DO YOU HAVE A DRINK CONTAINING ALCOHOL: 1
HOW OFTEN DURING THE LAST YEAR HAVE YOU HAD A FEELING OF GUILT OR REMORSE AFTER DRINKING: 0
AUDIT-C TOTAL SCORE: 1
HOW OFTEN DURING THE LAST YEAR HAVE YOU FAILED TO DO WHAT WAS NORMALLY EXPECTED FROM YOU BECAUSE OF DRINKING: 0
HOW OFTEN DURING THE LAST YEAR HAVE YOU NEEDED AN ALCOHOLIC DRINK FIRST THING IN THE MORNING TO GET YOURSELF GOING AFTER A NIGHT OF HEAVY DRINKING: 0
HAVE YOU OR SOMEONE ELSE BEEN INJURED AS A RESULT OF YOUR DRINKING: 0
HOW OFTEN DURING THE LAST YEAR HAVE YOU BEEN UNABLE TO REMEMBER WHAT HAPPENED THE NIGHT BEFORE BECAUSE YOU HAD BEEN DRINKING: 0
AUDIT TOTAL SCORE: 1
HOW MANY STANDARD DRINKS CONTAINING ALCOHOL DO YOU HAVE ON A TYPICAL DAY: 0
HOW OFTEN DURING THE LAST YEAR HAVE YOU FOUND THAT YOU WERE NOT ABLE TO STOP DRINKING ONCE YOU HAD STARTED: 0
HOW OFTEN DO YOU HAVE SIX OR MORE DRINKS ON ONE OCCASION: 0
HAS A RELATIVE, FRIEND, DOCTOR, OR ANOTHER HEALTH PROFESSIONAL EXPRESSED CONCERN ABOUT YOUR DRINKING OR SUGGESTED YOU CUT DOWN: 0

## 2020-10-03 ASSESSMENT — PATIENT HEALTH QUESTIONNAIRE - PHQ9
1. LITTLE INTEREST OR PLEASURE IN DOING THINGS: 0
SUM OF ALL RESPONSES TO PHQ9 QUESTIONS 1 & 2: 0
2. FEELING DOWN, DEPRESSED OR HOPELESS: 0
SUM OF ALL RESPONSES TO PHQ QUESTIONS 1-9: 0
SUM OF ALL RESPONSES TO PHQ QUESTIONS 1-9: 0

## 2020-10-28 ASSESSMENT — LIFESTYLE VARIABLES
HOW OFTEN DO YOU HAVE SIX OR MORE DRINKS ON ONE OCCASION: NEVER
HOW MANY STANDARD DRINKS CONTAINING ALCOHOL DO YOU HAVE ON A TYPICAL DAY: 0
AUDIT TOTAL SCORE: 0
HOW OFTEN DURING THE LAST YEAR HAVE YOU FOUND THAT YOU WERE NOT ABLE TO STOP DRINKING ONCE YOU HAD STARTED: NEVER
HOW OFTEN DURING THE LAST YEAR HAVE YOU HAD A FEELING OF GUILT OR REMORSE AFTER DRINKING: NEVER
HOW OFTEN DURING THE LAST YEAR HAVE YOU FOUND THAT YOU WERE NOT ABLE TO STOP DRINKING ONCE YOU HAD STARTED: 0
HOW OFTEN DURING THE LAST YEAR HAVE YOU HAD A FEELING OF GUILT OR REMORSE AFTER DRINKING: 0
HOW OFTEN DURING THE LAST YEAR HAVE YOU NEEDED AN ALCOHOLIC DRINK FIRST THING IN THE MORNING TO GET YOURSELF GOING AFTER A NIGHT OF HEAVY DRINKING: NEVER
AUDIT TOTAL SCORE: 1
HAVE YOU OR SOMEONE ELSE BEEN INJURED AS A RESULT OF YOUR DRINKING: 0
HOW OFTEN DO YOU HAVE SIX OR MORE DRINKS ON ONE OCCASION: 0
HAVE YOU OR SOMEONE ELSE BEEN INJURED AS A RESULT OF YOUR DRINKING: NO
HOW OFTEN DURING THE LAST YEAR HAVE YOU FAILED TO DO WHAT WAS NORMALLY EXPECTED FROM YOU BECAUSE OF DRINKING: NEVER
HOW OFTEN DO YOU HAVE A DRINK CONTAINING ALCOHOL: 1
HOW OFTEN DURING THE LAST YEAR HAVE YOU NEEDED AN ALCOHOLIC DRINK FIRST THING IN THE MORNING TO GET YOURSELF GOING AFTER A NIGHT OF HEAVY DRINKING: 0
HOW OFTEN DURING THE LAST YEAR HAVE YOU BEEN UNABLE TO REMEMBER WHAT HAPPENED THE NIGHT BEFORE BECAUSE YOU HAD BEEN DRINKING: 0
HAS A RELATIVE, FRIEND, DOCTOR, OR ANOTHER HEALTH PROFESSIONAL EXPRESSED CONCERN ABOUT YOUR DRINKING OR SUGGESTED YOU CUT DOWN: 0
AUDIT-C TOTAL SCORE: 0
HOW OFTEN DURING THE LAST YEAR HAVE YOU BEEN UNABLE TO REMEMBER WHAT HAPPENED THE NIGHT BEFORE BECAUSE YOU HAD BEEN DRINKING: NEVER
AUDIT-C TOTAL SCORE: 1
HAS A RELATIVE, FRIEND, DOCTOR, OR ANOTHER HEALTH PROFESSIONAL EXPRESSED CONCERN ABOUT YOUR DRINKING OR SUGGESTED YOU CUT DOWN: NO
HOW OFTEN DURING THE LAST YEAR HAVE YOU FAILED TO DO WHAT WAS NORMALLY EXPECTED FROM YOU BECAUSE OF DRINKING: 0
HOW MANY STANDARD DRINKS CONTAINING ALCOHOL DO YOU HAVE ON A TYPICAL DAY: ONE OR TWO
HOW OFTEN DO YOU HAVE A DRINK CONTAINING ALCOHOL: MONTHLY OR LESS

## 2020-10-28 ASSESSMENT — PATIENT HEALTH QUESTIONNAIRE - PHQ9
2. FEELING DOWN, DEPRESSED OR HOPELESS: 0
SUM OF ALL RESPONSES TO PHQ9 QUESTIONS 1 & 2: 0
SUM OF ALL RESPONSES TO PHQ QUESTIONS 1-9: 0
1. LITTLE INTEREST OR PLEASURE IN DOING THINGS: 0
SUM OF ALL RESPONSES TO PHQ QUESTIONS 1-9: 0
SUM OF ALL RESPONSES TO PHQ QUESTIONS 1-9: 0

## 2020-11-04 ENCOUNTER — OFFICE VISIT (OUTPATIENT)
Dept: FAMILY MEDICINE CLINIC | Age: 75
End: 2020-11-04
Payer: MEDICARE

## 2020-11-04 VITALS
TEMPERATURE: 98.2 F | HEIGHT: 70 IN | BODY MASS INDEX: 24.48 KG/M2 | RESPIRATION RATE: 18 BRPM | WEIGHT: 171 LBS | OXYGEN SATURATION: 98 % | DIASTOLIC BLOOD PRESSURE: 59 MMHG | SYSTOLIC BLOOD PRESSURE: 103 MMHG | HEART RATE: 73 BPM

## 2020-11-04 PROCEDURE — 1123F ACP DISCUSS/DSCN MKR DOCD: CPT | Performed by: STUDENT IN AN ORGANIZED HEALTH CARE EDUCATION/TRAINING PROGRAM

## 2020-11-04 PROCEDURE — 3017F COLORECTAL CA SCREEN DOC REV: CPT | Performed by: STUDENT IN AN ORGANIZED HEALTH CARE EDUCATION/TRAINING PROGRAM

## 2020-11-04 PROCEDURE — G0439 PPPS, SUBSEQ VISIT: HCPCS | Performed by: STUDENT IN AN ORGANIZED HEALTH CARE EDUCATION/TRAINING PROGRAM

## 2020-11-04 PROCEDURE — 4040F PNEUMOC VAC/ADMIN/RCVD: CPT | Performed by: STUDENT IN AN ORGANIZED HEALTH CARE EDUCATION/TRAINING PROGRAM

## 2020-11-04 PROCEDURE — G8484 FLU IMMUNIZE NO ADMIN: HCPCS | Performed by: STUDENT IN AN ORGANIZED HEALTH CARE EDUCATION/TRAINING PROGRAM

## 2020-11-04 RX ORDER — QUINAPRIL 20 MG/1
TABLET ORAL
Qty: 30 TABLET | Refills: 1 | Status: SHIPPED
Start: 2020-11-04 | End: 2021-01-11

## 2020-11-04 RX ORDER — ZOSTER VACCINE RECOMBINANT, ADJUVANTED 50 MCG/0.5
0.5 KIT INTRAMUSCULAR SEE ADMIN INSTRUCTIONS
Qty: 0.5 ML | Refills: 0 | Status: SHIPPED | OUTPATIENT
Start: 2020-11-04 | End: 2021-05-03

## 2020-11-04 RX ORDER — ZOSTER VACCINE RECOMBINANT, ADJUVANTED 50 MCG/0.5
0.5 KIT INTRAMUSCULAR SEE ADMIN INSTRUCTIONS
Qty: 0.5 ML | Refills: 0 | Status: SHIPPED
Start: 2020-11-04 | End: 2020-11-04

## 2020-11-04 NOTE — PROGRESS NOTES
[facility]  Precepting Note    Subjective:  75 yo M here for Sharyn Brothers, PMH of HTN  Also has h/o prostate issues - has followed with urology  BP Readings from Last 3 Encounters:   11/04/20 (!) 103/59   08/26/20 108/78   05/26/20 117/75     ROS otherwise as per resident    Past medical, surgical, family and social history were reviewed, non-contributory, and unchanged unless otherwise stated. Objective:    BP (!) 103/59   Pulse 73   Temp 98.2 °F (36.8 °C) (Temporal)   Resp 18   Ht 5' 10\" (1.778 m)   Wt 171 lb (77.6 kg)   SpO2 98%   BMI 24.54 kg/m²     Exam is as noted by resident with the following changes, additions or corrections:    General:  NAD; alert & oriented x 3   Heart:  RRR, no murmurs, gallops, or rubs. Lungs:  CTA bilaterally, no wheeze, rales or rhonchi  Abd: bowel sounds present, nontender, nondistended, no masses  Extrem:  No clubbing, cyanosis, or edema    Assessment/Plan:    Annual wellness visit   Addressed preventive medicine topics including fall prevention, vaccinations  Will need routine labs  Discuss blood pressure management with PCP - may be able to reduce quinapril dose based on his bp trends. He is currently taking 1/2 to 1/3 of his 40 mg tablet of quinapril   Due for labs     Attending Physician Statement  I have reviewed the chart, including any radiology or labs, and have seen the patient with the resident(s). I personally reviewed and performed key elements of the history and exam.  I agree with the assessment, plan and orders as documented by the resident. Please refer to the resident note for additional information.       Electronically signed by Nisreen Thomas MD on 11/4/2020 at 2:06 PM

## 2020-11-04 NOTE — PATIENT INSTRUCTIONS
Personalized Preventive Plan for Ada Longoria - 11/4/2020  Medicare offers a range of preventive health benefits. Some of the tests and screenings are paid in full while other may be subject to a deductible, co-insurance, and/or copay. Some of these benefits include a comprehensive review of your medical history including lifestyle, illnesses that may run in your family, and various assessments and screenings as appropriate. After reviewing your medical record and screening and assessments performed today your provider may have ordered immunizations, labs, imaging, and/or referrals for you. A list of these orders (if applicable) as well as your Preventive Care list are included within your After Visit Summary for your review. Other Preventive Recommendations:    · A preventive eye exam performed by an eye specialist is recommended every 1-2 years to screen for glaucoma; cataracts, macular degeneration, and other eye disorders. · A preventive dental visit is recommended every 6 months. · Try to get at least 150 minutes of exercise per week or 10,000 steps per day on a pedometer . · Order or download the FREE \"Exercise & Physical Activity: Your Everyday Guide\" from [employer]. Call 9-384.285.5362 or search [employer] online. · You need 4460-0144 mg of calcium and 2791-2845 IU of vitamin D per day. It is possible to meet your calcium requirement with diet alone, but a vitamin D supplement is usually necessary to meet this goal.  · When exposed to the sun, use a sunscreen that protects against both UVA and UVB radiation with an SPF of 30 or greater. Reapply every 2 to 3 hours or after sweating, drying off with a towel, or swimming. · Always wear a seat belt when traveling in a car. Always wear a helmet when riding a bicycle or motorcycle.

## 2020-11-04 NOTE — PROGRESS NOTES
Medicare Annual Wellness Visit  Name: Reford Askew Date: 2020   MRN: <[id number]> Sex: Male   Age: 76 y.o. Ethnicity: Non-/Non    : 1945 Race: Ashley Yanes is here for Medicare AWV    Screenings for behavioral, psychosocial and functional/safety risks, and cognitive dysfunction are all negative except as indicated below. These results, as well as other patient data from the [address] form, are documented in Flowsheets linked to this Encounter. No Known Allergies      Prior to Visit Medications    Medication Sig Taking? Authorizing Provider   quinapril (ACCUPRIL) 20 MG tablet TAKE ONE TABLET BY MOUTH DAILY Yes Kane Morejon MD   zoster recombinant adjuvanted vaccine [facility]) 50 MCG/0.5ML SUSR injection Inject 0.5 mLs into the muscle See Admin Instructions 1 dose now and repeat in 2-6 months Yes Kane Morejon MD   SYMBICORT 80-4.5 MCG/ACT AERO INHALE 2 PUFFS BY MOUTH TWICE DAILY; RINSE MOUTH AFTER USE  Irina Bright MD   omeprazole (PRILOSEC) 20 MG delayed release capsule TAKE 1 CAPSULE BY MOUTH ONCE DAILY IN THE MORNING BEFORE BREAKFAST  Irina Bright MD   fluticasone (FLONASE) 50 MCG/ACT nasal spray 1 spray by Each Nostril route daily  Irina Bright MD   Multiple Vitamins-Minerals (MULTIVITAMIN-MINERALS) TABS tablet Take by mouth  Historical Provider, MD Ray Allen Serenoa repens, (SAW PALMETTO EXTRACT PO) Take by mouth  Historical Provider, MD   Ginkgo Biloba 40 MG TABS Take  by mouth. Historical Provider, MD RAY ALLEN by Does not apply route.   Historical Provider, MD         Past Medical History:   Diagnosis Date    Dyspnea on exertion     chronic; notes mostly with stairs    Elevated PSA     Sees Dr Kaylyn Santiago    Hypertension     Mitral valve disorder     MVP on echo 2011    Sciatica 9/15/2014    Seasonal allergies        Past Surgical History:   Procedure Laterality Date    APPENDECTOMY  age 26    COLONOSCOPY 2007    Dr Kwaku Collins         Family History   Problem Relation Age of Onset    Heart Disease Father 62    Kidney Disease Father     Cancer Mother 48    Heart Disease Brother        CareTeam (Including outside providers/suppliers regularly involved in providing care):   Patient Care Team:  Yuliya Rogers MD as PCP - General (Family Medicine)  [facility]    Wt Readings from Last 3 Encounters:   11/04/20 171 lb (77.6 kg)   08/26/20 170 lb (77.1 kg)   05/26/20 168 lb (76.2 kg)     Vitals:    11/04/20 1327   BP: (!) 103/59   Pulse: 73   Resp: 18   Temp: 98.2 °F (36.8 °C)   TempSrc: Temporal   SpO2: 98%   Weight: 171 lb (77.6 kg)   Height: 5' 10\" (1.778 m)     Body mass index is 24.54 kg/m². Based upon direct observation of the patient, evaluation of cognition reveals recent and remote memory intact.     General Appearance: alert and oriented to person, place and time, well developed and well- nourished, in no acute distress  Skin: warm and dry, no rash or erythema  Head: normocephalic and atraumatic  Eyes: pupils equal, round, and reactive to light, extraocular eye movements intact, conjunctivae normal  ENT: tympanic membrane, external ear and ear canal normal bilaterally, nose without deformity  Neck: supple and non-tender without mass, no thyromegaly or thyroid nodules, no cervical lymphadenopathy  Pulmonary/Chest: clear to auscultation bilaterally- no wheezes, rales or rhonchi, normal air movement, no respiratory distress  Cardiovascular: normal rate, regular rhythm, normal S1 and S2, no murmurs, rubs, clicks, or gallops, distal pulses intact, no carotid bruits  Abdomen: soft, non-tender, non-distended, normal bowel sounds, no masses or organomegaly  Extremities: no cyanosis, clubbing or edema  Musculoskeletal: normal range of motion, no joint swelling, deformity or tenderness  Neurologic: reflexes normal and symmetric, no cranial nerve deficit, gait, coordination and speech normal    Patient's complete Health Risk Assessment and screening values have been reviewed and are found in Flowsheets. The following problems were reviewed today and where indicated follow up appointments were made and/or referrals ordered. Positive Risk Factor Screenings with Interventions:       General Health and ACP:  General  In general, how would you say your health is?: Very Good  In the past 7 days, have you experienced any of the following?  New or Increased Pain, New or Increased Fatigue, Loneliness, Social Isolation, Stress or Anger?: None of These  Do you get the social and emotional support that you need?: Yes  Do you have a Living Will?: Yes  Advance Directives     Power of  Living Will ACP-Advance Directive ACP-Power of     Not on File Not on File [address] [facility] Risk Interventions:  · None needed    Hearing/Vision:  No exam data present  Hearing/Vision  Do you or your family notice any trouble with your hearing?: No  Do you have difficulty driving, watching TV, or doing any of your daily activities because of your eyesight?: No  Have you had an eye exam within the past year?: (!) No  Hearing/Vision Interventions:  · Vision concerns:  patient encouraged to make appointment with his/her eye specialist    Personalized Preventive Plan   Current Health Maintenance Status  Immunization History   Administered Date(s) Administered    Hepatitis B 06/04/2018, 08/06/2018, 12/19/2018    Influenza, Triv, 3 Years and older, IM, PF (Afluria 5yrs and older) 10/15/2016    Pneumococcal Conjugate 13-valent (Wilhemenia Sensing) 09/05/2018    Pneumococcal Polysaccharide (Ooolispmi37) 03/05/2013    Zoster Live (Zostavax) 09/04/2012        Health Maintenance   Topic Date Due    Hepatitis C screen  1945    DTaP/Tdap/Td vaccine (1 - Tdap) 11/07/1964    Shingles Vaccine (2 of 3) 10/30/2012    Annual Wellness Visit (AWV)  05/29/2019    PSA counseling  02/11/2020    Flu vaccine (1) 09/01/2020    Potassium monitoring  09/25/2020    Creatinine monitoring  09/25/2020    Lipid screen  09/25/2024    Colon cancer screen colonoscopy  05/06/2029    Pneumococcal 65+ years Vaccine  Completed    AAA screen  Completed    Hepatitis A vaccine  Aged Out    Hepatitis B vaccine  Aged Out    Hib vaccine  Aged Out    Meningococcal (ACWY) vaccine  Aged Out     Recommendations for [employer] Due: see orders and patient instructions/AVS.  . Recommended screening schedule for the next 5-10 years is provided to the patient in written form: see Patient Instructions/AVS.    Follows with Dr. Jeannette Dennison for history of elevated PSA, last biopsy 3 months ago. -Borderline hypotensive in office today without symptoms. He gets low readings at home too occasionally. He has been taking fractions of his 40 mg quinapril-we will reduce the dose to 20 mg    Giulia was seen today for medicare awv. Diagnoses and all orders for this visit:    Routine general medical examination at a health care facility  -     COMPREHENSIVE METABOLIC PANEL; Future    Essential hypertension  -     COMPREHENSIVE METABOLIC PANEL; Future  -     quinapril (ACCUPRIL) 20 MG tablet; TAKE ONE TABLET BY MOUTH DAILY    Other orders  -     Cancel: Zoster recombinant [facility])  -     Discontinue: zoster recombinant adjuvanted vaccine (SHINGRIX) 50 MCG/0.5ML SUSR injection; Inject 0.5 mLs into the muscle See Admin Instructions 1 dose now and repeat in 2-6 months  -     zoster recombinant adjuvanted vaccine [facility]) 50 MCG/0.5ML SUSR injection;  Inject 0.5 mLs into the muscle See Admin Instructions 1 dose now and repeat in 2-6 months

## 2020-11-16 ENCOUNTER — TELEPHONE (OUTPATIENT)
Dept: FAMILY MEDICINE CLINIC | Age: 75
End: 2020-11-16

## 2020-11-16 NOTE — TELEPHONE ENCOUNTER
Patient left voicemail that his wife tested positive for covid. I returned call for more information but did not reach him. He said he called HR and they advised him to call his PCP. He has no symptoms besides what he calls his typical seasonal asthma.

## 2020-11-17 NOTE — TELEPHONE ENCOUNTER
Patient notified and verbalizes understanding. He does not feel he needs to be tested and has already been quarantining.

## 2020-12-02 LAB
BUN BLDV-MCNC: ABNORMAL MG/DL
CALCIUM SERPL-MCNC: ABNORMAL MG/DL
CHLORIDE BLD-SCNC: ABNORMAL MMOL/L
CO2: ABNORMAL
CREAT SERPL-MCNC: ABNORMAL MG/DL
GFR CALCULATED: ABNORMAL
GLUCOSE BLD-MCNC: ABNORMAL MG/DL
POTASSIUM SERPL-SCNC: ABNORMAL MMOL/L
SODIUM BLD-SCNC: ABNORMAL MMOL/L

## 2021-01-13 DIAGNOSIS — R49.0 HOARSENESS OF VOICE: ICD-10-CM

## 2021-01-13 RX ORDER — OMEPRAZOLE 20 MG/1
CAPSULE, DELAYED RELEASE ORAL
Qty: 90 CAPSULE | Refills: 1 | Status: SHIPPED
Start: 2021-01-13 | End: 2022-07-20

## 2021-08-10 ENCOUNTER — OFFICE VISIT (OUTPATIENT)
Dept: FAMILY MEDICINE CLINIC | Age: 76
End: 2021-08-10
Payer: MEDICARE

## 2021-08-10 ENCOUNTER — TELEPHONE (OUTPATIENT)
Dept: FAMILY MEDICINE CLINIC | Age: 76
End: 2021-08-10

## 2021-08-10 VITALS
OXYGEN SATURATION: 98 % | BODY MASS INDEX: 24.11 KG/M2 | WEIGHT: 168 LBS | SYSTOLIC BLOOD PRESSURE: 128 MMHG | TEMPERATURE: 97.6 F | DIASTOLIC BLOOD PRESSURE: 80 MMHG | HEART RATE: 78 BPM

## 2021-08-10 DIAGNOSIS — S69.92XA INJURY OF LEFT HAND, INITIAL ENCOUNTER: Primary | ICD-10-CM
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
## 2021-08-10 DIAGNOSIS — M79.642 LEFT HAND PAIN: ICD-10-CM

## 2021-08-10 DIAGNOSIS — S62.647A CLOSED NONDISPLACED FRACTURE OF PROXIMAL PHALANX OF LEFT LITTLE FINGER, INITIAL ENCOUNTER: ICD-10-CM

## 2021-08-10 PROCEDURE — 4040F PNEUMOC VAC/ADMIN/RCVD: CPT | Performed by: PHYSICIAN ASSISTANT

## 2021-08-10 PROCEDURE — 1036F TOBACCO NON-USER: CPT | Performed by: PHYSICIAN ASSISTANT

## 2021-08-10 PROCEDURE — G8420 CALC BMI NORM PARAMETERS: HCPCS | Performed by: PHYSICIAN ASSISTANT

## 2021-08-10 PROCEDURE — 3017F COLORECTAL CA SCREEN DOC REV: CPT | Performed by: PHYSICIAN ASSISTANT

## 2021-08-10 PROCEDURE — 1123F ACP DISCUSS/DSCN MKR DOCD: CPT | Performed by: PHYSICIAN ASSISTANT

## 2021-08-10 PROCEDURE — 99204 OFFICE O/P NEW MOD 45 MIN: CPT | Performed by: PHYSICIAN ASSISTANT

## 2021-08-10 PROCEDURE — G8427 DOCREV CUR MEDS BY ELIG CLIN: HCPCS | Performed by: PHYSICIAN ASSISTANT

## 2021-08-10 ASSESSMENT — ENCOUNTER SYMPTOMS
COUGH: 0
SORE THROAT: 0
BACK PAIN: 0
PHOTOPHOBIA: 0
VOMITING: 0
NAUSEA: 0
SHORTNESS OF BREATH: 0
ABDOMINAL PAIN: 0
DIARRHEA: 0

## 2021-08-10 NOTE — PROGRESS NOTES
8/10/21  Gabriel Champ : 1945 Sex: male  Age 76 y.o. Subjective:  Chief Complaint   Patient presents with    Hand Pain     left     Fall     x 1 week          70-year-old male with a history of hypertension, sciatica and BPH presents to the walk-in clinic for evaluation of left hand pain. Patient states approximately 1 week ago he had a mechanical fall. He states that he was walking outside in a paved area when he tripped and fell. He states that he caught himself with his left hand. There was no head injury. There was no loss of consciousness. He has no complaints of headache or neck pain. He does not take anticoagulants. He states that he has been trying to manage his symptoms at home but they are not improving. He is right-hand dominant. He is not taking any over-the-counter analgesics. He states most of the pain is in his left pinky extending into the fifth metacarpal of his left hand. He denies any numbness or tingling. Patient states he is able to  and make a fist.        Review of Systems   Constitutional: Negative for chills and fever. HENT: Negative for congestion, ear pain and sore throat. Eyes: Negative for photophobia and visual disturbance. Respiratory: Negative for cough and shortness of breath. Cardiovascular: Negative for chest pain. Gastrointestinal: Negative for abdominal pain, diarrhea, nausea and vomiting. Genitourinary: Negative for difficulty urinating, dysuria, frequency and urgency. Musculoskeletal: Negative for back pain, neck pain and neck stiffness. Left hand pain   Skin: Negative for rash. Neurological: Negative for dizziness, syncope, weakness, light-headedness and headaches. Hematological: Negative for adenopathy. Does not bruise/bleed easily. Psychiatric/Behavioral: Negative for agitation and confusion. All other systems reviewed and are negative.         PMH:     Past Medical History:   Diagnosis Date    Dyspnea on exertion chronic; notes mostly with stairs    Elevated PSA     Sees Dr Gilmore Hypertension     Mitral valve disorder     MVP on echo 2011    Sciatica 9/15/2014    Seasonal allergies        Past Surgical History:   Procedure Laterality Date    APPENDECTOMY  age 28    COLONOSCOPY      Dr Nav Payne       Family History   Problem Relation Age of Onset    Heart Disease Father 62    Kidney Disease Father     Cancer Mother 48    Heart Disease Brother        Medications:     Current Outpatient Medications:     omeprazole (PRILOSEC) 20 MG delayed release capsule, TAKE 1 CAPSULE BY MOUTH ONCE DAILY IN THE MORNING BEFORE BREAKFAST, Disp: 90 capsule, Rfl: 1    quinapril (ACCUPRIL) 20 MG tablet, Take 1 tablet by mouth once daily, Disp: 30 tablet, Rfl: 5    SYMBICORT 80-4.5 MCG/ACT AERO, INHALE 2 PUFFS BY MOUTH TWICE DAILY; RINSE MOUTH AFTER USE, Disp: 11 g, Rfl: 5    fluticasone (FLONASE) 50 MCG/ACT nasal spray, 1 spray by Each Nostril route daily, Disp: 1 Bottle, Rfl: 1    Multiple Vitamins-Minerals (MULTIVITAMIN-MINERALS) TABS tablet, Take by mouth, Disp: , Rfl:     Saw Palmetto, Serenoa repens, (SAW PALMETTO EXTRACT PO), Take by mouth, Disp: , Rfl:     Ginkgo Biloba 40 MG TABS, Take  by mouth., Disp: , Rfl:     SAW PALMETTO, by Does not apply route., Disp: , Rfl:     Allergies:   No Known Allergies    Social History:     Social History     Tobacco Use    Smoking status: Former Smoker     Packs/day: 1.00     Years: 16.00     Pack years: 16.00     Types: Cigarettes     Quit date: 1980     Years since quittin.6    Smokeless tobacco: Never Used   Substance Use Topics    Alcohol use: No    Drug use: Not on file       Patient lives at home. Physical Exam:     Vitals:    08/10/21 0812   BP: 128/80   Pulse: 78   Temp: 97.6 °F (36.4 °C)   TempSrc: Temporal   SpO2: 98%   Weight: 168 lb (76.2 kg)       Exam:  Physical Exam  Vitals and nursing note reviewed.    Constitutional:       General: He is not in acute distress. Appearance: He is well-developed. HENT:      Head: Normocephalic and atraumatic. Eyes:      Conjunctiva/sclera: Conjunctivae normal.      Pupils: Pupils are equal, round, and reactive to light. Cardiovascular:      Rate and Rhythm: Normal rate and regular rhythm. Pulmonary:      Effort: Pulmonary effort is normal. No respiratory distress. Breath sounds: Normal breath sounds. Abdominal:      General: Bowel sounds are normal.      Palpations: Abdomen is soft. Tenderness: There is no abdominal tenderness. Musculoskeletal:         General: Normal range of motion. Cervical back: Normal range of motion. Comments: There is no obvious deformity of the left hand. There is no deficit of the left wrist. He has normal sensation and coloring. Pulses are intact at the radius. He has some ecchymosis and tenderness palpation of the PIP joint of the  little finger of the left hand with some tenderness palpation in the fifth metacarpal as well. Skin:     General: Skin is warm and dry. Neurological:      Mental Status: He is alert and oriented to person, place, and time. Psychiatric:         Behavior: Behavior normal.         Thought Content: Thought content normal.         Judgment: Judgment normal.           Testing:           Medical Decision Making:     Patient upon arrival did not appear toxic or lethargic. Vital signs were reviewed. Past medical history reviewed. Allergies reviewed. Medications reviewed. Patient is presenting with the above complaint of left hand pain. We discussed differential diagnosis. We also discussed fall prevention. Patient will be sent for an x-ray of the left hand. Further recommendations will be pending these results. X-ray reveals a transverse fracture of the proximal phalanx of the little finger with slight impaction. There is no significant angulation.     The patient returned to the office and was placed in a finger splint with mariela taping to the adjacent finger. He was neurovascularly intact pre and post splinting. Patient will follow up with orthopedics and referral was placed. He is to rest, ice and elevate the left hand. He may use Tylenol and Motrin. Patient understands the plan is agreeable. Clinical Impression:   Gladys Garg was seen today for hand pain and fall. Diagnoses and all orders for this visit:    Injury of left hand, initial encounter  -     XR HAND LEFT (MIN 3 VIEWS); Future  -     Cancel: Splint wrist velcro    Left hand pain  -     XR HAND LEFT (MIN 3 VIEWS); Future  -     Cancel: Splint wrist velcro    Fall, initial encounter    Closed nondisplaced fracture of proximal phalanx of left little finger, initial encounter  -     AFL - Amilcar Latham MD, Orthopaedics, [facility]    Other orders  -     DME Order for [facility]) as OP        The patient is to call for any concerns or return if any of the signs or symptoms worsen. The patient is to follow-up with PCP in the next 2-3 days for repeat evaluation repeat assessment or go directly to the emergency department. SIGNATURE: Elina Stewart PA-C

## 2021-08-10 NOTE — TELEPHONE ENCOUNTER
Please call the patient and let him know that his x-ray reveals a fracture of the left little finger. He will not need the splint for his hand through [facility] but he should come back into the office to be placed in a finger splint. I will also put in a referral for an orthopedic consult.

## 2021-08-11 ENCOUNTER — OFFICE VISIT (OUTPATIENT)
Dept: FAMILY MEDICINE CLINIC | Age: 76
End: 2021-08-11
Payer: MEDICARE

## 2021-08-11 ENCOUNTER — PATIENT MESSAGE (OUTPATIENT)
Dept: PRIMARY CARE CLINIC | Age: 76
End: 2021-08-11

## 2021-08-11 VITALS
OXYGEN SATURATION: 98 % | RESPIRATION RATE: 18 BRPM | TEMPERATURE: 97.9 F | WEIGHT: 169 LBS | BODY MASS INDEX: 24.2 KG/M2 | HEART RATE: 70 BPM | SYSTOLIC BLOOD PRESSURE: 139 MMHG | HEIGHT: 70 IN | DIASTOLIC BLOOD PRESSURE: 86 MMHG

## 2021-08-11 DIAGNOSIS — S62.347D CLOSED NONDISPLACED FRACTURE OF BASE OF FIFTH METACARPAL BONE OF LEFT HAND WITH ROUTINE HEALING, SUBSEQUENT ENCOUNTER: ICD-10-CM

## 2021-08-11 DIAGNOSIS — R29.6 RECURRENT FALLS: ICD-10-CM

## 2021-08-11 DIAGNOSIS — R26.89 FUNCTIONAL GAIT ABNORMALITY: Primary | ICD-10-CM

## 2021-08-11 PROCEDURE — G8427 DOCREV CUR MEDS BY ELIG CLIN: HCPCS | Performed by: FAMILY MEDICINE

## 2021-08-11 PROCEDURE — 1123F ACP DISCUSS/DSCN MKR DOCD: CPT | Performed by: FAMILY MEDICINE

## 2021-08-11 PROCEDURE — 3017F COLORECTAL CA SCREEN DOC REV: CPT | Performed by: FAMILY MEDICINE

## 2021-08-11 PROCEDURE — 1036F TOBACCO NON-USER: CPT | Performed by: FAMILY MEDICINE

## 2021-08-11 PROCEDURE — 99213 OFFICE O/P EST LOW 20 MIN: CPT | Performed by: FAMILY MEDICINE

## 2021-08-11 PROCEDURE — 4040F PNEUMOC VAC/ADMIN/RCVD: CPT | Performed by: FAMILY MEDICINE

## 2021-08-11 PROCEDURE — G8420 CALC BMI NORM PARAMETERS: HCPCS | Performed by: FAMILY MEDICINE

## 2021-08-11 SDOH — ECONOMIC STABILITY: FOOD INSECURITY: WITHIN THE PAST 12 MONTHS, THE FOOD YOU BOUGHT JUST DIDN'T LAST AND YOU DIDN'T HAVE MONEY TO GET MORE.: NEVER TRUE

## 2021-08-11 SDOH — ECONOMIC STABILITY: FOOD INSECURITY: WITHIN THE PAST 12 MONTHS, YOU WORRIED THAT YOUR FOOD WOULD RUN OUT BEFORE YOU GOT MONEY TO BUY MORE.: NEVER TRUE

## 2021-08-11 ASSESSMENT — PATIENT HEALTH QUESTIONNAIRE - PHQ9
SUM OF ALL RESPONSES TO PHQ QUESTIONS 1-9: 0
SUM OF ALL RESPONSES TO PHQ9 QUESTIONS 1 & 2: 0
1. LITTLE INTEREST OR PLEASURE IN DOING THINGS: 0
2. FEELING DOWN, DEPRESSED OR HOPELESS: 0
SUM OF ALL RESPONSES TO PHQ QUESTIONS 1-9: 0
SUM OF ALL RESPONSES TO PHQ QUESTIONS 1-9: 0

## 2021-08-11 ASSESSMENT — SOCIAL DETERMINANTS OF HEALTH (SDOH): HOW HARD IS IT FOR YOU TO PAY FOR THE VERY BASICS LIKE FOOD, HOUSING, MEDICAL CARE, AND HEATING?: NOT HARD AT ALL

## 2021-08-11 NOTE — PROGRESS NOTES
CC   Chief Complaint   Patient presents with    Fall     HPI:   Patient comes in today for the below issue(s). Falls  3 times in past 6 months  1st time was fairy innocent- tripped  2nd time was stopping while climbing stairs at a stadium   Most recent time occurred while walking at a stadium- tried to lift foot up toward and tripped on curb   Fell forward and ended up with a nondisplaced fx of left 5th nondisplaced metacarpal   He states he is worried about this and his risk for further injury or trauma  He states he has no hip or knee pain  He has no loss of vision or visual acuity  He remains vigorous and his activity level and exercises nearly daily  He does not report any weakness in the limb or appearance of dragging his feet    Review of Systems  Review of Systems       Past Medical History:   Diagnosis Date    Dyspnea on exertion     chronic; notes mostly with stairs    Elevated PSA     Sees Dr Isidoro Byers    Hypertension     Mitral valve disorder     MVP on echo 8/2011    Sciatica 9/15/2014    Seasonal allergies          PE:  VS:  /86   Pulse 70   Temp 97.9 °F (36.6 °C) (Temporal)   Resp 18   Ht 5' 10\" (1.778 m)   Wt 169 lb (76.7 kg)   SpO2 98%   BMI 24.25 kg/m²   Physical Exam  Vitals reviewed. Constitutional:       Appearance: Normal appearance. He is not toxic-appearing. Musculoskeletal:      Right hip: No crepitus. Normal range of motion. Normal strength. Left hip: Crepitus present. Normal range of motion. Normal strength. Right knee: Crepitus present. No effusion or bony tenderness. Normal range of motion. Normal alignment. Left knee: Crepitus present. No effusion or bony tenderness. Normal range of motion. Normal alignment. Comments: Ecchymosis over dorsum of left hand over the fifth metacarpal.  Neurovascularly digit appears intact   Neurological:      Mental Status: He is alert.       Gait: Gait normal.      Deep Tendon Reflexes: Reflexes normal. Psychiatric:         Mood and Affect: Mood normal.         Behavior: Behavior normal.           Assessment (including specific orders/meds/labs):      Sergio Painter was seen today for fall. Diagnoses and all orders for this visit:    Functional gait abnormality  -     External Referral To Physical Therapy  -     XR HIP BILATERAL W AP PELVIS (2 VIEWS); Future  -     XR KNEE LEFT (3 VIEWS); Future  -     XR KNEE RIGHT (3 VIEWS); Future    Recurrent falls  -     External Referral To Physical Therapy    Closed nondisplaced fracture of base of fifth metacarpal bone of left hand with routine healing, subsequent encounter        Plan:     Defer management of fracture to orthopedics. He has gait dysfunction with 3 falls in the past 6 months. His gait is noted to be normal and nonantalgic. He does have some crepitus on range of motion of the hips and knees. I will check imaging. Will refer to physical therapy for gait training. Counseled on risks of further falls with injury. 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 He felt he had too much difficulty in reaching the office. Expressed empathy towards his frustration. I assured him I would pass these concerns on to administrative leaders    Counseled regarding above diagnosis, including possible risks and complications,  especially if left uncontrolled. Counseled regarding the possible sideeffects, risks, benefits and alternatives to treatment; patient and/or guardian verbalizes understanding, agrees, feels comfortable with and wishes to proceed with above treatment plan. Call or go to ED immediately if symptoms worsen or persist. Advised patient to call with any new medication issues, and, as applicable, read all Rx info from pharmacy to assure aware ofall possible risks and side effects of medication before taking.      As applicable, educational materials and/or home exercises printed forpatient's review and were included in patient instructions on his/her After Visit Summary and given to patient at the end of visit. Patient and/or guardian given opportunity to ask questions/raise concerns. He verbalized comfort and understanding of instructions. Follow Up:     Return in about 6 weeks (around 9/22/2021), or if symptoms worsen or fail to improve, for gait check . or sooner if the above issues change unexpectedly or new issues develop     This document was prepared at least partially through the use of[employer] Charlie[employer] recognition software. Although effort is taken to assure the accuracy of this document, it is possible that grammatical, syntax,  or spelling errors may occur.       Electronically signed by aMrgarita Partida MD [facility]

## 2021-08-11 NOTE — TELEPHONE ENCOUNTER
From: Thalia Love  To: ALIYAH Oreilly  Sent: 8/11/2021 8:38 AM EDT  Subject: Non-Urgent Arthor Abler Ms. Abbey Gillespie:  I have not been contacted by orthopedics yet.   Aashish Olson

## 2021-09-14 NOTE — TELEPHONE ENCOUNTER
Howard Juarez, not sure why this telephone message was forwarded to me. But looks like Lilo Sandhu placed it and it wasn't done. Sorry that I just took a look at it. Can you please look into it?

## 2021-10-06 ENCOUNTER — TELEPHONE (OUTPATIENT)
Dept: FAMILY MEDICINE CLINIC | Age: 76
End: 2021-10-06

## 2021-10-06 NOTE — TELEPHONE ENCOUNTER
----- Message from [facility] sent at 10/6/2021  9:29 AM EDT -----  Subject: Message to Provider    QUESTIONS  Information for Provider? Pt would like to speak with nurse about his   medication. He would like to try a diff med but need Dr approval   ---------------------------------------------------------------------------  --------------  Shreya APONTE  What is the best way for the office to contact you? OK to leave message on   voicemail  Preferred Call Back Phone Number? [phone]  ---------------------------------------------------------------------------  --------------  SCRIPT ANSWERS  Relationship to Patient?  Self

## 2021-10-06 NOTE — TELEPHONE ENCOUNTER
Spoke to patient. Per his insurance they suggest a cheaper option for his Symbicort would be Advair. Please send to [facility] in [place] if appropriate.

## 2021-11-23 DIAGNOSIS — I10 ESSENTIAL HYPERTENSION: ICD-10-CM

## 2021-11-23 RX ORDER — QUINAPRIL 20 MG/1
TABLET ORAL
Qty: 30 TABLET | Refills: 5 | Status: SHIPPED
Start: 2021-11-23 | End: 2022-07-20 | Stop reason: SDUPTHER

## 2021-12-27 ENCOUNTER — OFFICE VISIT (OUTPATIENT)
Dept: FAMILY MEDICINE CLINIC | Age: 76
End: 2021-12-27
Payer: MEDICARE

## 2021-12-27 VITALS
BODY MASS INDEX: 25.34 KG/M2 | DIASTOLIC BLOOD PRESSURE: 80 MMHG | RESPIRATION RATE: 16 BRPM | OXYGEN SATURATION: 96 % | TEMPERATURE: 97.7 F | SYSTOLIC BLOOD PRESSURE: 148 MMHG | HEART RATE: 56 BPM | WEIGHT: 177 LBS | HEIGHT: 70 IN

## 2021-12-27 DIAGNOSIS — M62.838 MUSCLE SPASMS OF NECK: Primary | ICD-10-CM

## 2021-12-27 PROCEDURE — 4040F PNEUMOC VAC/ADMIN/RCVD: CPT | Performed by: STUDENT IN AN ORGANIZED HEALTH CARE EDUCATION/TRAINING PROGRAM

## 2021-12-27 PROCEDURE — G8417 CALC BMI ABV UP PARAM F/U: HCPCS | Performed by: STUDENT IN AN ORGANIZED HEALTH CARE EDUCATION/TRAINING PROGRAM

## 2021-12-27 PROCEDURE — 1036F TOBACCO NON-USER: CPT | Performed by: STUDENT IN AN ORGANIZED HEALTH CARE EDUCATION/TRAINING PROGRAM

## 2021-12-27 PROCEDURE — G8484 FLU IMMUNIZE NO ADMIN: HCPCS | Performed by: STUDENT IN AN ORGANIZED HEALTH CARE EDUCATION/TRAINING PROGRAM

## 2021-12-27 PROCEDURE — 1123F ACP DISCUSS/DSCN MKR DOCD: CPT | Performed by: STUDENT IN AN ORGANIZED HEALTH CARE EDUCATION/TRAINING PROGRAM

## 2021-12-27 PROCEDURE — 99213 OFFICE O/P EST LOW 20 MIN: CPT | Performed by: STUDENT IN AN ORGANIZED HEALTH CARE EDUCATION/TRAINING PROGRAM

## 2021-12-27 PROCEDURE — G8427 DOCREV CUR MEDS BY ELIG CLIN: HCPCS | Performed by: STUDENT IN AN ORGANIZED HEALTH CARE EDUCATION/TRAINING PROGRAM

## 2021-12-27 RX ORDER — TIZANIDINE 4 MG/1
4 TABLET ORAL 3 TIMES DAILY PRN
Qty: 30 TABLET | Refills: 0 | Status: SHIPPED | OUTPATIENT
Start: 2021-12-27 | End: 2022-01-06

## 2021-12-27 NOTE — PROGRESS NOTES
WALK-IN CARE CLINIC VISIT    21  Name: Michelle Howard   : 1945   Age: 68 y.o. Sex: male        Assessment & Plan:       ICD-10-CM    1. Muscle spasms of neck  M62.838 tiZANidine (ZANAFLEX) 4 MG tablet        No spinous process tenderness, injury, or red flag symptoms. Will defer imaging. Treat acute muscle spasms with Zanaflex. Advised to use sparingly, avoid driving or using machinery afterwards. Advised of fall risk. Patient endorses understanding. Provided neck stretches for home. Counseled patient regarding above diagnosis, including possible risks and complications, especially if left uncontrolled. Counseled patient as appropriate and relevant regarding any possible side effects, risks, and alternatives to treatment; the patient verbalizes understanding, and is in agreement with the plan as detailed above. All educational materials and instructions were discussed and included on the After Visit Summary. All questions answered to the patient's satisfaction. The patient was advised to call for any concerns or return if any of the signs or symptoms worsen. This provider was wearing N95 mask and shield. The patient was wearing surgical mask. We practiced social distancing when appropriate during visit due to COVID-19 pandemic. Subjective:     Chief Complaint   Patient presents with    Shoulder Pain    Neck Pain       Patient presents with 2 days of muscle spasm left neck and shoulder  Woke up with this pain after sleeping  Did shoulder exercises the day before spasm started  Denies injury  Had difficulty turning his head yesterday due to pain and stiffness  Took wife's muscle relaxer tizanidine 4mg and Aleve with some relief  ROM improved today    Notes his BP is high this morning because he hasn't taken his medication this morning    Review of Systems   Musculoskeletal: Positive for myalgias, neck pain and neck stiffness. Skin: Negative for rash and wound.    Neurological: Negative for weakness, light-headedness, numbness and headaches.        Medical History:     Patient Active Problem List   Diagnosis    Elevated PSA    Mitral valve disorder    BPH associated with nocturia    Hypertensive disorder    Sciatica        Past Medical History:   Diagnosis Date    Dyspnea on exertion     chronic; notes mostly with stairs    Elevated PSA     Sees Dr Beulah Swain Hypertension     Mitral valve disorder     MVP on echo 8/2011    Sciatica 9/15/2014    Seasonal allergies        Past Surgical History:   Procedure Laterality Date    APPENDECTOMY  age 28    COLONOSCOPY  2007    Dr Beverley Andrade       Family History   Problem Relation Age of Onset    Heart Disease Father 62    Kidney Disease Father     Cancer Mother 48    Heart Disease Brother        Medications:     Current Outpatient Medications:     quinapril (ACCUPRIL) 20 MG tablet, Take 1 tablet by mouth once daily, Disp: 30 tablet, Rfl: 5    fluticasone-salmeterol (ADVAIR DISKUS) 100-50 MCG/DOSE diskus inhaler, Inhale 1 puff into the lungs 2 times daily Rinse mouth after use., Disp: 1 each, Rfl: 11    fluticasone (FLONASE) 50 MCG/ACT nasal spray, 1 spray by Each Nostril route daily, Disp: 1 Bottle, Rfl: 1    Multiple Vitamins-Minerals (MULTIVITAMIN-MINERALS) TABS tablet, Take by mouth, Disp: , Rfl:     Saw Palmetto, Serenoa repens, (SAW PALMETTO EXTRACT PO), Take by mouth, Disp: , Rfl:     Ginkgo Biloba 40 MG TABS, Take  by mouth., Disp: , Rfl:     SAW PALMETTO, by Does not apply route., Disp: , Rfl:     omeprazole (PRILOSEC) 20 MG delayed release capsule, TAKE 1 CAPSULE BY MOUTH ONCE DAILY IN THE MORNING BEFORE BREAKFAST (Patient not taking: Reported on 12/27/2021), Disp: 90 capsule, Rfl: 1    Allergies:   No Known Allergies    Social History:     Social History     Socioeconomic History    Marital status:      Spouse name: Not on file    Number of children: Not on file    Years of education: Not on file   [facility] Highest education level: Not on file   Occupational History    Not on file   Tobacco Use    Smoking status: Former Smoker     Packs/day: 1.00     Years: 16.00     Pack years: 16.00     Types: Cigarettes     Quit date: 1980     Years since quittin.0    Smokeless tobacco: Never Used   Substance and Sexual Activity    Alcohol use: No    Drug use: Not on file    Sexual activity: Yes     Partners: Female   Other Topics Concern    Not on file   Social History Narrative    Not on file     Social Determinants of Health     Financial Resource Strain: Low Risk     Difficulty of Paying Living Expenses: Not hard at all   Food Insecurity: No Food Insecurity    Worried About Running Out of Food in the Last Year: Never true    Dolores of Food in the Last Year: Never true   Transportation Needs:     Lack of Transportation (Medical): Not on file    Lack of Transportation (Non-Medical):  Not on file   Physical Activity:     Days of Exercise per Week: Not on file    Minutes of Exercise per Session: Not on file   Stress:     Feeling of Stress : Not on file   Social Connections:     Frequency of Communication with Friends and Family: Not on file    Frequency of Social Gatherings with Friends and Family: Not on file    Attends Alevism Services: Not on file    Active Member of [address] [place] [address] [employer] or Organizations: Not on file    Attends Club or Organization Meetings: Not on file    Marital Status: Not on file   Intimate Partner Violence:     Fear of Current or Ex-Partner: Not on file    Emotionally Abused: Not on file    Physically Abused: Not on file    Sexually Abused: Not on file   Housing Stability:     Unable to Pay for Housing in the Last Year: Not on file    Number of [place] in the Last Year: Not on file    Unstable Housing in the Last Year: Not on file       Physical Exam:     Vitals:    21 0907 21 0910   BP: (!) 148/80 (!) 148/80   Pulse: 56    Resp: 16    Temp: 97.7 °F (36.5 °C) TempSrc: Temporal    SpO2: 96%    Weight: 177 lb (80.3 kg)    Height: 5' 10\" (1.778 m)         Physical Exam  Vitals and nursing note reviewed. Constitutional:       General: He is not in acute distress. Appearance: Normal appearance. He is not ill-appearing or diaphoretic. Cardiovascular:      Rate and Rhythm: Normal rate. Pulmonary:      Effort: No respiratory distress. Musculoskeletal:      Right shoulder: Normal range of motion. Normal strength. Left shoulder: Normal range of motion. Normal strength. Cervical back: Neck supple. Spasms present. No edema, erythema, signs of trauma, rigidity, torticollis, bony tenderness or crepitus. Pain with movement and muscular tenderness (left paraspinal, with spasm) present. No spinous process tenderness. Decreased range of motion (limited leftward motion). Skin:     General: Skin is warm and dry. Neurological:      Mental Status: He is alert and oriented to person, place, and time. Testing:   No orders of the defined types were placed in this encounter. No results found for this or any previous visit (from the past 24 hour(s)).

## 2021-12-27 NOTE — PATIENT INSTRUCTIONS
Take tizanidine as needed for muscle spasm. May cause fatigue or lightheadedness. Do not take prior to driving. May also take up to 500mg naproxen up to twice daily with food. Continue heat, massage, stretching. Patient Education        Neck: Exercises  Introduction  Here are some examples of exercises for you to try. The exercises may be suggested for a condition or for rehabilitation. Start each exercise slowly. Ease off the exercises if you start to have pain. You will be told when to start these exercises and which ones will work best for you. How to do the exercises  Neck stretch    1. This stretch works best if you keep your shoulder down as you lean away from it. To help you remember to do this, start by relaxing your shoulders and lightly holding on to your thighs or your chair. 2. Tilt your head toward your shoulder and hold for 15 to 30 seconds. Let the weight of your head stretch your muscles. 3. If you would like a little added stretch, use your hand to gently and steadily pull your head toward your shoulder. For example, keeping your right shoulder down, lean your head to the left. 4. Repeat 2 to 4 times toward each shoulder. Diagonal neck stretch    1. Turn your head slightly toward the direction you will be stretching, and tilt your head diagonally toward your chest and hold for 15 to 30 seconds. 2. If you would like a little added stretch, use your hand to gently and steadily pull your head forward on the diagonal.  3. Repeat 2 to 4 times toward each side. Dorsal glide stretch    The dorsal glide stretches the back of the neck. If you feel pain, do not glide so far back. Some people find this exercise easier to do while lying on their backs with an ice pack on the neck. 1. Sit or stand tall and look straight ahead. 2. Slowly tuck your chin as you glide your head backward over your body  3. Hold for a count of 6, and then relax for up to 10 seconds.   4. Repeat 8 to 12 times.  Chest and shoulder stretch    1. Sit or stand tall and glide your head backward as in the dorsal glide stretch. 2. Raise both arms so that your hands are next to your ears. 3. Take a deep breath, and as you breathe out, lower your elbows down and behind your back. You will feel your shoulder blades slide down and together, and at the same time you will feel a stretch across your chest and the front of your shoulders. 4. Hold for about 6 seconds, and then relax for up to 10 seconds. 5. Repeat 8 to 12 times. Strengthening: Hands on head    1. Move your head backward, forward, and side to side against gentle pressure from your hands, holding each position for about 6 seconds. 2. Repeat 8 to 12 times. Follow-up care is a key part of your treatment and safety. Be sure to make and go to all appointments, and call your doctor if you are having problems. It's also a good idea to know your test results and keep a list of the medicines you take. Where can you learn more? Go to https://PageFair.[employer]. org and sign in to your [employer] account. Enter P975 in the [employer] box to learn more about \"Neck: Exercises. \"     If you do not have an account, please click on the \"Sign Up Now\" link. Current as of: July 1, 2021               Content Version: 13.1  © 0946-7201 [employer], [employer]. Care instructions adapted under license by [facility] ([facility]). If you have questions about a medical condition or this instruction, always ask your healthcare professional. [place] [address] any warranty or liability for your use of this information.

## 2022-01-18 NOTE — TELEPHONE ENCOUNTER
Referral to Dr. Allison Jeff in basket to be done.     Electronically signed by René Cevallos LPN on 8/99/0817 at 2:97 PM

## 2022-03-29 ENCOUNTER — TELEPHONE (OUTPATIENT)
Dept: FAMILY MEDICINE CLINIC | Age: 77
End: 2022-03-29

## 2022-03-29 NOTE — TELEPHONE ENCOUNTER
Patient received a letter stating that he hasn't been seen in office and wondered if something was needed of him. Patient has been scheduled for an AWV in July, 2022. Please call patient back if something else is needed.

## 2022-03-29 NOTE — TELEPHONE ENCOUNTER
Called patient for more information. Do not see record of anything coming from our office. He said the letter was very generic and did not seem like an official letter.

## 2022-03-29 NOTE — TELEPHONE ENCOUNTER
Broadly, he does not specifically need to come in unless there is any issue going on that I am not aware of  I am not aware of any letter, I do not see one in the chart  Would you be so kind as to bring it in so we could review it?

## 2022-07-20 ENCOUNTER — OFFICE VISIT (OUTPATIENT)
Dept: FAMILY MEDICINE CLINIC | Age: 77
End: 2022-07-20
Payer: MEDICARE

## 2022-07-20 VITALS
TEMPERATURE: 97.7 F | BODY MASS INDEX: 24.62 KG/M2 | DIASTOLIC BLOOD PRESSURE: 62 MMHG | HEIGHT: 70 IN | WEIGHT: 172 LBS | HEART RATE: 69 BPM | OXYGEN SATURATION: 97 % | RESPIRATION RATE: 18 BRPM | SYSTOLIC BLOOD PRESSURE: 108 MMHG

## 2022-07-20 DIAGNOSIS — R26.89 FUNCTIONAL GAIT ABNORMALITY: ICD-10-CM

## 2022-07-20 DIAGNOSIS — Z79.899 MEDICATION MANAGEMENT: ICD-10-CM

## 2022-07-20 DIAGNOSIS — Z00.00 MEDICARE ANNUAL WELLNESS VISIT, SUBSEQUENT: Primary | ICD-10-CM

## 2022-07-20 DIAGNOSIS — J98.4 RESTRICTIVE LUNG DISEASE: ICD-10-CM

## 2022-07-20 DIAGNOSIS — I10 ESSENTIAL HYPERTENSION: ICD-10-CM

## 2022-07-20 DIAGNOSIS — Z12.5 SCREENING FOR MALIGNANT NEOPLASM OF PROSTATE: ICD-10-CM

## 2022-07-20 DIAGNOSIS — R15.9 INCONTINENCE OF FECES, UNSPECIFIED FECAL INCONTINENCE TYPE: ICD-10-CM

## 2022-07-20 PROCEDURE — G0439 PPPS, SUBSEQ VISIT: HCPCS | Performed by: FAMILY MEDICINE

## 2022-07-20 PROCEDURE — 1123F ACP DISCUSS/DSCN MKR DOCD: CPT | Performed by: FAMILY MEDICINE

## 2022-07-20 RX ORDER — QUINAPRIL 20 MG/1
TABLET ORAL
Qty: 30 TABLET | Refills: 5 | Status: SHIPPED | OUTPATIENT
Start: 2022-07-20

## 2022-07-20 RX ORDER — FLUTICASONE PROPIONATE 50 MCG
1 SPRAY, SUSPENSION (ML) NASAL DAILY
Qty: 1 EACH | Refills: 1 | Status: SHIPPED | OUTPATIENT
Start: 2022-07-20

## 2022-07-20 ASSESSMENT — PATIENT HEALTH QUESTIONNAIRE - PHQ9
2. FEELING DOWN, DEPRESSED OR HOPELESS: 0
SUM OF ALL RESPONSES TO PHQ9 QUESTIONS 1 & 2: 0
SUM OF ALL RESPONSES TO PHQ QUESTIONS 1-9: 0
1. LITTLE INTEREST OR PLEASURE IN DOING THINGS: 0
SUM OF ALL RESPONSES TO PHQ QUESTIONS 1-9: 0

## 2022-07-20 ASSESSMENT — LIFESTYLE VARIABLES
HOW OFTEN DO YOU HAVE A DRINK CONTAINING ALCOHOL: MONTHLY OR LESS
HOW MANY STANDARD DRINKS CONTAINING ALCOHOL DO YOU HAVE ON A TYPICAL DAY: 1 OR 2

## 2022-07-20 NOTE — PATIENT INSTRUCTIONS
Personalized Preventive Plan for [facility] - 7/20/2022  Medicare offers a range of preventive health benefits. Some of the tests and screenings are paid in full while other may be subject to a deductible, co-insurance, and/or copay. Some of these benefits include a comprehensive review of your medical history including lifestyle, illnesses that may run in your family, and various assessments and screenings as appropriate. After reviewing your medical record and screening and assessments performed today your provider may have ordered immunizations, labs, imaging, and/or referrals for you. A list of these orders (if applicable) as well as your Preventive Care list are included within your After Visit Summary for your review. Other Preventive Recommendations:    A preventive eye exam performed by an eye specialist is recommended every 1-2 years to screen for glaucoma; cataracts, macular degeneration, and other eye disorders. A preventive dental visit is recommended every 6 months. Try to get at least 150 minutes of exercise per week or 10,000 steps per day on a pedometer . Order or download the FREE \"Exercise & Physical Activity: Your Everyday Guide\" from [employer]. Call 8-801.326.8282 or search [employer] online. You need 6231-6656 mg of calcium and 6943-8011 IU of vitamin D per day. It is possible to meet your calcium requirement with diet alone, but a vitamin D supplement is usually necessary to meet this goal.  When exposed to the sun, use a sunscreen that protects against both UVA and UVB radiation with an SPF of 30 or greater. Reapply every 2 to 3 hours or after sweating, drying off with a towel, or swimming. Always wear a seat belt when traveling in a car. Always wear a helmet when riding a bicycle or motorcycle.

## 2022-07-20 NOTE — PROGRESS NOTES
Medicare Annual Wellness Visit    Bhakti Mendez is here for Medicare AWV    Assessment & Plan   Medicare annual wellness visit, subsequent  Essential hypertension  -     quinapril (ACCUPRIL) 20 MG tablet; Take 1 tablet by mouth once daily, Disp-30 tablet, R-5Normal  -     CBC with Auto Differential; Future  -     Comprehensive Metabolic Panel; Future  -     Lipid Panel; Future  Screening for malignant neoplasm of prostate  -     PSA Screening; Future  Functional gait abnormality  Medication management  -     quinapril (ACCUPRIL) 20 MG tablet; Take 1 tablet by mouth once daily, Disp-30 tablet, R-5Normal  -     fluticasone (FLONASE) 50 MCG/ACT nasal spray; 1 spray by Each Nostril route in the morning., Disp-1 each, R-1Normal  Incontinence of feces, unspecified fecal incontinence type  Restrictive lung disease      Hypertension is at goal.  He will continue on current medications. Medication management performed. Meds refilled. Restrictive lung disease is stable. Continue current meds. Gait abnormality has improved with increased focus and attention on his gait. We will continue to monitor. Offered referral to GI for fecal incontinence. He states he wants to think about it. Routine labs being ordered as above. Recommendations for Preventive Services Due: see orders and patient instructions/AVS.  Recommended screening schedule for the next 5-10 years is provided to the patient in written form: see Patient Instructions/AVS.     Return if symptoms worsen or fail to improve, for Medicare Annual Wellness Visit in 1 year. Subjective   The following acute and/or chronic problems were also addressed today:      Hypertension  Stable on current regimen  Reports compliance with meds, denies side effects  Follows blood pressure at home, generally well controlled.   Rarely will have feelings of hypotension    Gait abnormality  Issue has resolved with increased attention focus on his gait  Is any further falls    Restrictive lung disease  Stable  Reports compliant with inhaler  Reports no significant abnormalities with breathing. Fecal and urinary incontinence  Recurrent issues  States has fecal and urinary urgency followed by incontinence  Occurring approximately 1-2 times per month. Unaware of any specific triggers  States urology tried anticholinergic meds but these would cause significant side effects and he had to discontinue  He compensates by wearing adult pull-ups. Patient's complete Health Risk Assessment and screening values have been reviewed and are found in Flowsheets. The following problems were reviewed today and where indicated follow up appointments were made and/or referrals ordered. Positive Risk Factor Screenings with Interventions:             General Health and ACP:  General  In general, how would you say your health is?: Excellent  In the past 7 days, have you experienced any of the following: New or Increased Pain, New or Increased Fatigue, Loneliness, Social Isolation, Stress or Anger?: No  Do you get the social and emotional support that you need?: Yes  Do you have a Living Will?: Yes    Advance Directives       Power of  Living Will ACP-Advance Directive ACP-Power of     Not on File Not on File Not on File Not on File        General Health Risk Interventions:  No Living Will: ACP documents already completed- patient asked to provide copy to the office              Objective   Vitals:    07/20/22 1507   BP: 108/62   Pulse: 69   Resp: 18   Temp: 97.7 °F (36.5 °C)   TempSrc: Temporal   SpO2: 97%   Weight: 172 lb (78 kg)   Height: 5' 10\" (1.778 m)      Body mass index is 24.68 kg/m².       General: Well nourished, well developed, no acute distress  Eyes:  PERRL   Conjunctiva unremarkable   ENT:  TM's intact bilaterally, no effusion   Nasal:  No mucosal edema     No nasal drainage   Oral:  mucosa moist and pink             no posterior pharyngeal drainage     no posterior pharyngeal exudate  Neck:  Supple   No palpable cervical lymphoadenopathy   Thyroid without mass or enlargement  Resp: Lungs CTAB   Equal and adequate air exchange without accessory muscle use   No rales, rhonchi or wheeze  CV: S1S2 RRR   No murmur   Intact distal pulses   No edema  GI: Abdomen Soft, non tender, non distended   Normoactive bowel sounds   Rectal deferred   Deferred   MS: Physiologic ROM of all extremities    Intact distal pulses   No clubbing or cyanosis   Skin Warm and dry; no rash or lesion  Neuro: Alert and oriented; normal and intact dtr's   Psych: Euthymic mood, congruent affect          No Known Allergies  Prior to Visit Medications    Medication Sig Taking? Authorizing Provider   quinapril (ACCUPRIL) 20 MG tablet Take 1 tablet by mouth once daily Yes Felix Brown MD   fluticasone (FLONASE) 50 MCG/ACT nasal spray 1 spray by Each Nostril route in the morning. Yes Felix Brown MD   fluticasone-salmeterol (ADVAIR DISKUS) 100-50 MCG/DOSE diskus inhaler Inhale 1 puff into the lungs 2 times daily Rinse mouth after use. Yes Felix Brown MD   Multiple Vitamins-Minerals (MULTIVITAMIN-MINERALS) TABS tablet Take by mouth Yes Historical Provider, MD   Ginkgo Biloba 40 MG TABS Take  by mouth.  Yes Historical Provider, MD Ray Allen Serenoa repens, (SAW PALMETTO EXTRACT PO) Take by mouth  Historical Provider, MD Saini (Including outside providers/suppliers regularly involved in providing care):   Patient Care Team:  Felix Brown MD as PCP - General (Family Medicine)  Felix Brown MD as PCP - [facility] Empaneled Provider     Reviewed and updated this visit:  Tobacco  Allergies  Meds  Problems  Med Hx  Surg Hx  Soc Hx  Fam Hx

## 2022-07-27 LAB
A/G RATIO: 1.4 RATIO (ref 1.1–2.2)
ALBUMIN SERPL-MCNC: 4.1 G/DL (ref 3.4–4.8)
ALP BLD-CCNC: 88 U/L (ref 42–121)
ALT SERPL-CCNC: 23 U/L (ref 10–63)
ANION GAP SERPL CALCULATED.3IONS-SCNC: 7 MEQ/L (ref 3–11)
AST SERPL-CCNC: 25 U/L (ref 10–41)
BASOPHILS ABSOLUTE: 0.1 K/UL (ref 0–0.2)
BASOPHILS RELATIVE PERCENT: 1 % (ref 0–1.5)
BILIRUB SERPL-MCNC: 1.1 MG/DL (ref 0.3–1.5)
BUN BLDV-MCNC: 12 MG/DL (ref 6–20)
CALCIUM SERPL-MCNC: 9.1 MG/DL (ref 8.5–10.5)
CHLORIDE BLD-SCNC: 101 MEQ/L (ref 98–107)
CHOLESTEROL: 166 MG/DL (ref 140–200)
CO2: 27 MEQ/L (ref 21–31)
CREAT SERPL-MCNC: 1 MG/DL (ref 0.6–1.3)
CREATININE + EGFR PANEL: 88 ML/MIN
EOSINOPHILS ABSOLUTE: 0.3 K/UL (ref 0–0.33)
EOSINOPHILS RELATIVE PERCENT: 5.2 % (ref 0–3)
GFR NON-AFRICAN AMERICAN: 73 ML/MIN
GLOBULIN: 2.9 G/DL (ref 1.9–3.9)
GLUCOSE BLD-MCNC: 103 MG/DL (ref 70–99)
HCT VFR BLD CALC: 48.4 % (ref 41–50)
HDLC SERPL-MCNC: 36 MG/DL (ref 29–71)
HEMOGLOBIN: 16 G/DL (ref 13.5–16.5)
LDL CHOLESTEROL: 115 MG/DL
LDL/HDL RATIO: 3.2 RATIO
LYMPHOCYTES ABSOLUTE: 0.9 K/UL (ref 1.1–4.8)
LYMPHOCYTES RELATIVE PERCENT: 15.2 % (ref 24–44)
MCH RBC QN AUTO: 29.2 PG (ref 28–34)
MCHC RBC AUTO-ENTMCNC: 33.1 G/DL (ref 33–37)
MCV RBC AUTO: 88.1 FL (ref 80–100)
MONOCYTES ABSOLUTE: 0.8 K/UL (ref 0.2–0.7)
MONOCYTES RELATIVE PERCENT: 13.9 % (ref 3.4–9)
NEUTROPHILS ABSOLUTE: 3.9 K/UL (ref 1.83–8.7)
PDW BLD-RTO: 13.7 % (ref 10.9–14.3)
PLATELET # BLD: 161 K/UL (ref 150–450)
PMV BLD AUTO: 8.1 FL (ref 7.4–10.4)
POTASSIUM SERPL-SCNC: 3.8 MEQ/L (ref 3.6–5)
RBC # BLD: 5.49 M/UL (ref 4.5–5.5)
SEGMENTED NEUTROPHILS RELATIVE PERCENT: 64.7 % (ref 40–74)
SODIUM BLD-SCNC: 135 MEQ/L (ref 135–145)
TOTAL PROTEIN: 7 G/DL (ref 5.9–7.8)
TRIGL SERPL-MCNC: 83 MG/DL (ref 41–189)
WBC # BLD: 6 K/UL (ref 4.5–11)

## 2022-07-29 ENCOUNTER — TELEPHONE (OUTPATIENT)
Dept: FAMILY MEDICINE CLINIC | Age: 77
End: 2022-07-29

## 2022-07-29 NOTE — TELEPHONE ENCOUNTER
Patient has Covid and wonders if Dr. Megan aCno can prescribe something for him. He states his symptoms are mild and he can ride it out without medication.

## 2022-09-20 ENCOUNTER — TELEPHONE (OUTPATIENT)
Dept: FAMILY MEDICINE CLINIC | Age: 77
End: 2022-09-20

## 2022-09-20 NOTE — TELEPHONE ENCOUNTER
Patient states that he has tested positive for Covid today at [facility]. He hadtaken Paxlovid two months ago after testing positive then, but feels that his symptoms are mild enough to ride it out.

## 2022-10-24 RX ORDER — FLUTICASONE PROPIONATE AND SALMETEROL 100; 50 UG/1; UG/1
POWDER RESPIRATORY (INHALATION)
Qty: 60 EACH | Refills: 5 | Status: SHIPPED | OUTPATIENT
Start: 2022-10-24

## 2022-11-15 ENCOUNTER — OFFICE VISIT (OUTPATIENT)
Dept: FAMILY MEDICINE CLINIC | Age: 77
End: 2022-11-15
Payer: MEDICARE

## 2022-11-15 VITALS
BODY MASS INDEX: 24.48 KG/M2 | HEIGHT: 70 IN | WEIGHT: 171 LBS | DIASTOLIC BLOOD PRESSURE: 70 MMHG | HEART RATE: 82 BPM | RESPIRATION RATE: 20 BRPM | SYSTOLIC BLOOD PRESSURE: 128 MMHG | TEMPERATURE: 98 F | OXYGEN SATURATION: 100 %

## 2022-11-15 DIAGNOSIS — R19.7 DIARRHEA, UNSPECIFIED TYPE: ICD-10-CM

## 2022-11-15 DIAGNOSIS — A08.4 VIRAL GASTROENTERITIS: Primary | ICD-10-CM

## 2022-11-15 PROCEDURE — 3078F DIAST BP <80 MM HG: CPT

## 2022-11-15 PROCEDURE — G8420 CALC BMI NORM PARAMETERS: HCPCS

## 2022-11-15 PROCEDURE — 1123F ACP DISCUSS/DSCN MKR DOCD: CPT

## 2022-11-15 PROCEDURE — 99213 OFFICE O/P EST LOW 20 MIN: CPT

## 2022-11-15 PROCEDURE — 3074F SYST BP LT 130 MM HG: CPT

## 2022-11-15 PROCEDURE — G8484 FLU IMMUNIZE NO ADMIN: HCPCS

## 2022-11-15 PROCEDURE — 1036F TOBACCO NON-USER: CPT

## 2022-11-15 PROCEDURE — G8427 DOCREV CUR MEDS BY ELIG CLIN: HCPCS

## 2022-11-15 RX ORDER — ONDANSETRON 4 MG/1
4 TABLET, ORALLY DISINTEGRATING ORAL 3 TIMES DAILY PRN
Qty: 21 TABLET | Refills: 0 | Status: SHIPPED | OUTPATIENT
Start: 2022-11-15

## 2022-11-15 RX ORDER — IBUPROFEN 800 MG/1
TABLET ORAL
COMMUNITY
Start: 2022-10-03

## 2022-11-15 RX ORDER — DICYCLOMINE HCL 20 MG
20 TABLET ORAL 4 TIMES DAILY PRN
Qty: 30 TABLET | Refills: 0 | Status: SHIPPED | OUTPATIENT
Start: 2022-11-15

## 2022-11-15 NOTE — PROGRESS NOTES
Chief Complaint       Diarrhea    History of Present Illness   Source of history provided by:  patient. Diana Del Rio is a 68 y.o. old male presenting to the walk in clinic for complaints of diarrhea, decreased appetite, and diffuse crampy abdominal pain x 3 days. Pt states his symptoms have been improving. Has tried taking nothing OTC without symptomatic relief. Denies any fever, bloody stools, loss of taste/smell, hematochezia, CP, SOB, dysuria, hematuria, HA, sore throat, rash, or lethargy. No LMP for male patient. Pt has  been vaccinated for COVID-19. Pt last colonoscopy reports was 3 years ago. He has not been able to tolerate much food since Sunday. ROS    Unless otherwise stated in this report or unable to obtain because of the patient's clinical or mental status as evidenced by the medical record, this patients's positive and negative responses for Review of Systems, constitutional, psych, eyes, ENT, cardiovascular, respiratory, gastrointestinal, neurological, genitourinary, musculoskeletal, integument systems and systems related to the presenting problem are either stated in the preceding or were not pertinent or were negative for the symptoms and/or complaints related to the medical problem. Physical Exam         VS:  /70   Pulse 82   Temp 98 °F (36.7 °C) (Temporal)   Resp 20   Ht 5' 10\" (1.778 m)   Wt 171 lb (77.6 kg)   SpO2 100%   BMI 24.54 kg/m²    Oxygen Saturation Interpretation: Normal.    General Appearance/Constitutional:  Alert, development consistent with age, NAD. HEENT:  NCAT. MMMP  Lungs: CTAB without wheezing, rales, or rhonchi. Heart:  RRR, no murmurs, rubs, or gallops. Abdomen:  General Appearance: No obvious trauma or bruising. No rashes or lesions. Bowel sounds: BS+x4       Distension:  No distension. Tenderness: None. Liver/Spleen: Non-tender and no hepatosplenomegaly. Pulsatile Mass: None noted.    Back: CVA Tenderness: No bilateral tenderness or bruising. Skin:  Normal turgor. Warm, dry, without visible rash, unless noted elsewhere. Neurological:  Orientation age-appropriate. Motor functions intact. Lab / Imaging Results   (All laboratory and radiology results have been personally reviewed by myself)  Labs:  No results found for this visit on 11/15/22. Imaging: All Radiology results interpreted by Radiologist unless otherwise noted. Assessment / Plan     Impression(s):  Amanda Bal was seen today for diarrhea. Diagnoses and all orders for this visit:    Viral gastroenteritis  -     dicyclomine (BENTYL) 20 MG tablet; Take 1 tablet by mouth 4 times daily as needed (abdominal cramping)  -     ondansetron (ZOFRAN ODT) 4 MG disintegrating tablet; Take 1 tablet by mouth 3 times daily as needed for Nausea or Vomiting    Diarrhea, unspecified type    Disposition:  Disposition: Discharge to home. Pt advised that his illness is likely viral and should resolve with time and conservative measures. Script written for Zofran and Bentyl, side effects discussed. Increase fluids and rest. Clear liquids progressing to [employer] as tolerated. Advise f/u with PCP in 3-5 days if symptoms persist. ED sooner if symptoms worsen or change. ED immediately with the development of high or refractory fever, severe or worsening abdominal pain, hematochezia, coffee-ground emesis, bloody stools, lethargy, CP, or SOB. Pt states understanding and is in agreement with this care plan. All questions answered. ALIYAH Owen    **This report was transcribed using voice recognition software. Every effort was made to ensure accuracy; however, inadvertent computerized transcription errors may be present.

## 2023-06-27 ENCOUNTER — PATIENT MESSAGE (OUTPATIENT)
Dept: FAMILY MEDICINE CLINIC | Age: 78
End: 2023-06-27

## 2023-06-27 DIAGNOSIS — I10 ESSENTIAL HYPERTENSION: Primary | ICD-10-CM

## 2023-06-27 DIAGNOSIS — Z12.5 SCREENING FOR MALIGNANT NEOPLASM OF PROSTATE: ICD-10-CM

## 2023-07-18 LAB
A/G RATIO: 1.4 RATIO (ref 1.1–2.2)
ALBUMIN SERPL-MCNC: 4.4 G/DL (ref 3.4–4.8)
ALP BLD-CCNC: 81 U/L (ref 42–121)
ALT SERPL-CCNC: 25 U/L (ref 10–63)
ANION GAP SERPL CALCULATED.3IONS-SCNC: 8 MEQ/L (ref 3–11)
AST SERPL-CCNC: 31 U/L (ref 10–41)
BASOPHILS ABSOLUTE: 0.1 K/UL (ref 0–0.2)
BASOPHILS RELATIVE PERCENT: 1.2 % (ref 0–1.5)
BILIRUB SERPL-MCNC: 0.9 MG/DL (ref 0.3–1.5)
BUN BLDV-MCNC: 14 MG/DL (ref 6–20)
CALCIUM SERPL-MCNC: 9.5 MG/DL (ref 8.5–10.5)
CHLORIDE BLD-SCNC: 105 MEQ/L (ref 98–107)
CHOLESTEROL: 205 MG/DL (ref 140–200)
CO2: 26 MEQ/L (ref 21–31)
CREAT SERPL-MCNC: 1 MG/DL (ref 0.6–1.3)
CREATININE + EGFR PANEL: 88 ML/MIN
DIAGNOSTIC PSA: 12.45 NG/ML (ref 0–4)
EOSINOPHILS ABSOLUTE: 0.3 K/UL (ref 0–0.33)
EOSINOPHILS RELATIVE PERCENT: 5.3 % (ref 0–3)
GFR NON-AFRICAN AMERICAN: 72 ML/MIN
GLOBULIN: 3.1 G/DL (ref 1.9–3.9)
GLUCOSE BLD-MCNC: 107 MG/DL (ref 70–99)
HCT VFR BLD CALC: 51.2 % (ref 41–50)
HDLC SERPL-MCNC: 39 MG/DL (ref 29–71)
HEMOGLOBIN: 17 G/DL (ref 13.5–16.5)
LDL CHOLESTEROL: 154 MG/DL
LDL/HDL RATIO: 3.9 RATIO
LYMPHOCYTES ABSOLUTE: 1.5 K/UL (ref 1.1–4.8)
LYMPHOCYTES RELATIVE PERCENT: 23.5 % (ref 24–44)
MCH RBC QN AUTO: 29.3 PG (ref 28–34)
MCHC RBC AUTO-ENTMCNC: 33.1 G/DL (ref 33–37)
MCV RBC AUTO: 88.4 FL (ref 80–100)
MONOCYTES ABSOLUTE: 0.7 K/UL (ref 0.2–0.7)
MONOCYTES RELATIVE PERCENT: 11.6 % (ref 3.4–9)
NEUTROPHILS ABSOLUTE: 3.8 K/UL (ref 1.83–8.7)
PDW BLD-RTO: 13.7 % (ref 10.9–14.3)
PLATELET # BLD: 185 K/UL (ref 150–450)
PMV BLD AUTO: 8.5 FL (ref 7.4–10.4)
POTASSIUM SERPL-SCNC: 4 MEQ/L (ref 3.6–5)
RBC # BLD: 5.79 M/UL (ref 4.5–5.5)
SEGMENTED NEUTROPHILS RELATIVE PERCENT: 58.4 % (ref 40–74)
SODIUM BLD-SCNC: 139 MEQ/L (ref 135–145)
TOTAL PROTEIN: 7.5 G/DL (ref 5.9–7.8)
TRIGL SERPL-MCNC: 81 MG/DL (ref 41–189)

## 2023-07-25 PROBLEM — J98.4 RESTRICTIVE LUNG DISEASE: Status: ACTIVE | Noted: 2023-07-25

## 2023-07-25 PROBLEM — R15.9 INCONTINENCE OF FECES: Status: ACTIVE | Noted: 2023-07-25

## 2023-07-25 NOTE — PROGRESS NOTES
Medicare Annual Wellness Visit    Tom Cobian is here for Medicare AWV    Assessment & Plan   Medicare annual wellness visit, subsequent  Elevated PSA  -     AFL - Kelin Mac MD, Urology, [place]  BPH associated with nocturia  -     AFL - Kelin Mac MD, Urology, [place]  Primary hypertension  Incontinence of feces, unspecified fecal incontinence type  -     XR ABDOMEN (KUB) (SINGLE AP VIEW); Future  Restrictive lung disease  Essential hypertension  -     quinapril (ACCUPRIL) 20 MG tablet; Take 1 tablet by mouth once daily, Disp-30 tablet, R-5Print  Medication management  -     quinapril (ACCUPRIL) 20 MG tablet; Take 1 tablet by mouth once daily, Disp-30 tablet, R-5Print  Mixed hyperlipidemia  -     Lipid Panel; Future     Plan  Regarding the elevated PSA and BPH, he has failed multiple attempts to manage this medically. He is interested in pursuing a second opinion. Referral as above. Fecal incontinence is of uncertain significance. Certainly the possibility of run off constipation is entertained. Recommend KUB and then further assessment at that time based on those findings. History of restrictive lung disease is clinically stable    Hypertension is stable. He needs a refill on his quinapril. Checking routine status of lipids. He is on no medications at the current time and prefers to avoid it. He wants to see if he can manage this issue through continued lifestyle modification. .The 10-year ASCVD risk score (Zee REMY, et al., 2019) is: 30.9%    Values used to calculate the score:      Age: 68 years      Sex: Male      Is Non- : No      Diabetic: No      Tobacco smoker: No      Systolic Blood Pressure: 197 mmHg      Is BP treated: Yes      HDL Cholesterol: 39 mg/dL      Total Cholesterol: 205 mg/dL      Recommendations for Preventive Services Due: see orders and patient instructions/AVS.  Recommended screening schedule for the next 5-10 years is provided

## 2023-07-26 ENCOUNTER — OFFICE VISIT (OUTPATIENT)
Dept: FAMILY MEDICINE CLINIC | Age: 78
End: 2023-07-26

## 2023-07-26 VITALS
WEIGHT: 175 LBS | HEIGHT: 70 IN | SYSTOLIC BLOOD PRESSURE: 120 MMHG | BODY MASS INDEX: 25.05 KG/M2 | DIASTOLIC BLOOD PRESSURE: 81 MMHG | TEMPERATURE: 98.1 F | RESPIRATION RATE: 16 BRPM | HEART RATE: 83 BPM | OXYGEN SATURATION: 97 %

## 2023-07-26 DIAGNOSIS — N40.1 BPH ASSOCIATED WITH NOCTURIA: ICD-10-CM

## 2023-07-26 DIAGNOSIS — I10 ESSENTIAL HYPERTENSION: ICD-10-CM

## 2023-07-26 DIAGNOSIS — R35.1 BPH ASSOCIATED WITH NOCTURIA: ICD-10-CM

## 2023-07-26 DIAGNOSIS — R15.9 INCONTINENCE OF FECES, UNSPECIFIED FECAL INCONTINENCE TYPE: ICD-10-CM

## 2023-07-26 DIAGNOSIS — R97.20 ELEVATED PSA: ICD-10-CM

## 2023-07-26 DIAGNOSIS — Z00.00 MEDICARE ANNUAL WELLNESS VISIT, SUBSEQUENT: Primary | ICD-10-CM

## 2023-07-26 DIAGNOSIS — Z79.899 MEDICATION MANAGEMENT: ICD-10-CM

## 2023-07-26 DIAGNOSIS — J98.4 RESTRICTIVE LUNG DISEASE: ICD-10-CM

## 2023-07-26 DIAGNOSIS — E78.2 MIXED HYPERLIPIDEMIA: ICD-10-CM

## 2023-07-26 RX ORDER — QUINAPRIL 20 MG/1
TABLET ORAL
Qty: 30 TABLET | Refills: 5 | Status: SHIPPED | OUTPATIENT
Start: 2023-07-26 | End: 2023-07-28 | Stop reason: CLARIF

## 2023-07-26 RX ORDER — FLUTICASONE PROPIONATE AND SALMETEROL 100; 50 UG/1; UG/1
POWDER RESPIRATORY (INHALATION)
Qty: 60 EACH | Refills: 5 | Status: SHIPPED | OUTPATIENT
Start: 2023-07-26

## 2023-07-26 SDOH — ECONOMIC STABILITY: FOOD INSECURITY: WITHIN THE PAST 12 MONTHS, THE FOOD YOU BOUGHT JUST DIDN'T LAST AND YOU DIDN'T HAVE MONEY TO GET MORE.: NEVER TRUE

## 2023-07-26 SDOH — ECONOMIC STABILITY: TRANSPORTATION INSECURITY
IN THE PAST 12 MONTHS, HAS LACK OF TRANSPORTATION KEPT YOU FROM MEETINGS, WORK, OR FROM GETTING THINGS NEEDED FOR DAILY LIVING?: NO

## 2023-07-26 SDOH — HEALTH STABILITY: PHYSICAL HEALTH: ON AVERAGE, HOW MANY DAYS PER WEEK DO YOU ENGAGE IN MODERATE TO STRENUOUS EXERCISE (LIKE A BRISK WALK)?: 5 DAYS

## 2023-07-26 SDOH — ECONOMIC STABILITY: FOOD INSECURITY: WITHIN THE PAST 12 MONTHS, YOU WORRIED THAT YOUR FOOD WOULD RUN OUT BEFORE YOU GOT MONEY TO BUY MORE.: NEVER TRUE

## 2023-07-26 SDOH — HEALTH STABILITY: PHYSICAL HEALTH: ON AVERAGE, HOW MANY MINUTES DO YOU ENGAGE IN EXERCISE AT THIS LEVEL?: 30 MIN

## 2023-07-26 SDOH — ECONOMIC STABILITY: INCOME INSECURITY: HOW HARD IS IT FOR YOU TO PAY FOR THE VERY BASICS LIKE FOOD, HOUSING, MEDICAL CARE, AND HEATING?: NOT HARD AT ALL

## 2023-07-26 SDOH — ECONOMIC STABILITY: HOUSING INSECURITY
IN THE LAST 12 MONTHS, WAS THERE A TIME WHEN YOU DID NOT HAVE A STEADY PLACE TO SLEEP OR SLEPT IN A SHELTER (INCLUDING NOW)?: NO

## 2023-07-26 ASSESSMENT — PATIENT HEALTH QUESTIONNAIRE - PHQ9
2. FEELING DOWN, DEPRESSED OR HOPELESS: 0
SUM OF ALL RESPONSES TO PHQ QUESTIONS 1-9: 0
1. LITTLE INTEREST OR PLEASURE IN DOING THINGS: 0
SUM OF ALL RESPONSES TO PHQ QUESTIONS 1-9: 0
SUM OF ALL RESPONSES TO PHQ9 QUESTIONS 1 & 2: 0
SUM OF ALL RESPONSES TO PHQ QUESTIONS 1-9: 0
SUM OF ALL RESPONSES TO PHQ QUESTIONS 1-9: 0

## 2023-07-26 ASSESSMENT — LIFESTYLE VARIABLES
HOW MANY STANDARD DRINKS CONTAINING ALCOHOL DO YOU HAVE ON A TYPICAL DAY: PATIENT DOES NOT DRINK
HOW MANY STANDARD DRINKS CONTAINING ALCOHOL DO YOU HAVE ON A TYPICAL DAY: 0
HOW OFTEN DO YOU HAVE SIX OR MORE DRINKS ON ONE OCCASION: 1
HOW OFTEN DO YOU HAVE A DRINK CONTAINING ALCOHOL: 2
HOW OFTEN DO YOU HAVE A DRINK CONTAINING ALCOHOL: MONTHLY OR LESS

## 2023-07-28 ENCOUNTER — TELEPHONE (OUTPATIENT)
Dept: FAMILY MEDICINE CLINIC | Age: 78
End: 2023-07-28

## 2023-07-28 RX ORDER — LISINOPRIL 20 MG/1
20 TABLET ORAL DAILY
Qty: 90 TABLET | Refills: 1 | Status: SHIPPED | OUTPATIENT
Start: 2023-07-28

## 2023-07-28 NOTE — TELEPHONE ENCOUNTER
Changed to lisinopril 20 mg daily. While it should be an equivalent dose, I recommend he check his blood pressure at home to make sure no significant changes are seen.   Notify if anything unusual seems to be occurring

## 2023-07-28 NOTE — TELEPHONE ENCOUNTER
Patient states that the Quinapril 20 mg is not being made anymore and he would like a new medication sent to [facility] in [place]. He stated that Dr. Giovani Myers talked about a substitute medication at his visit.

## 2023-08-02 ENCOUNTER — TELEPHONE (OUTPATIENT)
Dept: FAMILY MEDICINE CLINIC | Age: 78
End: 2023-08-02

## 2023-08-02 NOTE — TELEPHONE ENCOUNTER
Pt returning call to [facility] for Xray results- ok to [facility] with results if pt does not answer.  Cell: [phone]

## 2023-08-16 DIAGNOSIS — R06.2 WHEEZING: ICD-10-CM

## 2023-08-16 DIAGNOSIS — J98.4 RESTRICTIVE LUNG DISEASE: ICD-10-CM

## 2023-08-16 RX ORDER — ALBUTEROL SULFATE 90 UG/1
2 AEROSOL, METERED RESPIRATORY (INHALATION) EVERY 6 HOURS PRN
Qty: 1 EACH | Refills: 5 | Status: SHIPPED | OUTPATIENT
Start: 2023-08-16

## 2023-08-16 NOTE — TELEPHONE ENCOUNTER
Last Appointment   2023  Next Appointment  2024    Patient doesn't use often so previous one  would like new script sent in

## 2024-01-02 RX ORDER — LISINOPRIL 20 MG/1
20 TABLET ORAL DAILY
Qty: 90 TABLET | Refills: 3 | Status: SHIPPED | OUTPATIENT
Start: 2024-01-02

## 2024-01-08 LAB
CHOLESTEROL: 174 MG/DL (ref 0–199)
HDLC SERPL-MCNC: 38 MG/DL
LDL CHOLESTEROL: 127 MG/DL (ref 0–99)
LDL/HDL RATIO: 3.3 RATIO
TRIGL SERPL-MCNC: 108 MG/DL (ref 0–149)

## 2024-01-31 ENCOUNTER — OFFICE VISIT (OUTPATIENT)
Dept: FAMILY MEDICINE CLINIC | Age: 79
End: 2024-01-31
Payer: MEDICARE

## 2024-01-31 VITALS
WEIGHT: 177 LBS | TEMPERATURE: 97.6 F | DIASTOLIC BLOOD PRESSURE: 72 MMHG | BODY MASS INDEX: 25.34 KG/M2 | OXYGEN SATURATION: 98 % | HEART RATE: 75 BPM | RESPIRATION RATE: 16 BRPM | HEIGHT: 70 IN | SYSTOLIC BLOOD PRESSURE: 105 MMHG

## 2024-01-31 DIAGNOSIS — Z12.5 SCREENING FOR MALIGNANT NEOPLASM OF PROSTATE: ICD-10-CM

## 2024-01-31 DIAGNOSIS — N40.1 BPH ASSOCIATED WITH NOCTURIA: ICD-10-CM

## 2024-01-31 DIAGNOSIS — E78.2 MIXED HYPERLIPIDEMIA: ICD-10-CM

## 2024-01-31 DIAGNOSIS — R35.1 BPH ASSOCIATED WITH NOCTURIA: ICD-10-CM

## 2024-01-31 DIAGNOSIS — I10 ESSENTIAL HYPERTENSION: ICD-10-CM

## 2024-01-31 DIAGNOSIS — R97.20 ELEVATED PSA: Primary | ICD-10-CM

## 2024-01-31 DIAGNOSIS — K59.09 CHRONIC CONSTIPATION WITH OVERFLOW INCONTINENCE: ICD-10-CM

## 2024-01-31 DIAGNOSIS — J98.4 RESTRICTIVE LUNG DISEASE: ICD-10-CM

## 2024-01-31 PROCEDURE — G8419 CALC BMI OUT NRM PARAM NOF/U: HCPCS | Performed by: FAMILY MEDICINE

## 2024-01-31 PROCEDURE — 99213 OFFICE O/P EST LOW 20 MIN: CPT | Performed by: FAMILY MEDICINE

## 2024-01-31 PROCEDURE — 1123F ACP DISCUSS/DSCN MKR DOCD: CPT | Performed by: FAMILY MEDICINE

## 2024-01-31 PROCEDURE — 3078F DIAST BP <80 MM HG: CPT | Performed by: FAMILY MEDICINE

## 2024-01-31 PROCEDURE — 1036F TOBACCO NON-USER: CPT | Performed by: FAMILY MEDICINE

## 2024-01-31 PROCEDURE — 3074F SYST BP LT 130 MM HG: CPT | Performed by: FAMILY MEDICINE

## 2024-01-31 PROCEDURE — G8484 FLU IMMUNIZE NO ADMIN: HCPCS | Performed by: FAMILY MEDICINE

## 2024-01-31 PROCEDURE — G8427 DOCREV CUR MEDS BY ELIG CLIN: HCPCS | Performed by: FAMILY MEDICINE

## 2024-01-31 ASSESSMENT — PATIENT HEALTH QUESTIONNAIRE - PHQ9
SUM OF ALL RESPONSES TO PHQ QUESTIONS 1-9: 0
1. LITTLE INTEREST OR PLEASURE IN DOING THINGS: 0
SUM OF ALL RESPONSES TO PHQ9 QUESTIONS 1 & 2: 0
SUM OF ALL RESPONSES TO PHQ QUESTIONS 1-9: 0
SUM OF ALL RESPONSES TO PHQ QUESTIONS 1-9: 0
2. FEELING DOWN, DEPRESSED OR HOPELESS: 0
SUM OF ALL RESPONSES TO PHQ QUESTIONS 1-9: 0

## 2024-01-31 NOTE — PROGRESS NOTES
and plan.    Return in about 6 months (around 7/31/2024), or if symptoms worsen or fail to improve, for keep next appt as scheduled or see sooner if neded.    SUBJECTIVE/OBJECTIVE:    HPI    Hypertension   Follow up  Stable on current regimen  Change to lisinopril was \"seamless\"   Reports compliance with meds, denies side effects  Follows blood pressure at home, generally well controlled.    He also has hyperlipidemia and has been trying to manage this without medication.  Due for repeat labs  The 10-year ASCVD risk score (Zee REMY, et al., 2019) is: 25.8%    Values used to calculate the score:      Age: 78 years      Sex: Male      Is Non- : No      Diabetic: No      Tobacco smoker: No      Systolic Blood Pressure: 105 mmHg      Is BP treated: Yes      HDL Cholesterol: 38 mg/dL      Total Cholesterol: 174 mg/dL      Gait abnormality  Issue has resolved with increased attention focus on his gait  Is any further falls    Restrictive lung disease  Stable  Reports compliant with inhaler  Reports no significant abnormalities with breathing.    Fecal and urinary incontinence  Recurrent issues  Saw a general surgeon for the fecal incontinence and they suggested it was overflow diarrhea from chronic constipation.  He has been hesitant to consider the recommended trial of laxatives.  He did not follow through as we had discussed previously.  He continues to have the same issues.  Having fecal urgency.  He is also noting liquid stool that is difficult to control.  He did not try the recommended MiraLAX  Has been working with urology for anticholinergic meds and meds for BPH but still is having significant side effect so he stopped them all.    He continues to have unpredictable urinary incontinence but it does seem to have a bit of a urge component to it.  Denies dysuria  He compensates by wearing adult pull-ups.  He did not seek the second opinion that was offered at last visit.  He is open to it

## 2024-02-01 ASSESSMENT — ENCOUNTER SYMPTOMS
NAUSEA: 0
TROUBLE SWALLOWING: 0
CHEST TIGHTNESS: 0
COUGH: 0
WHEEZING: 0
DIARRHEA: 1
RECTAL PAIN: 0
ABDOMINAL DISTENTION: 0
ABDOMINAL PAIN: 0
SHORTNESS OF BREATH: 0

## 2024-02-19 DIAGNOSIS — I10 ESSENTIAL HYPERTENSION: ICD-10-CM

## 2024-02-19 DIAGNOSIS — Z12.5 SCREENING FOR MALIGNANT NEOPLASM OF PROSTATE: ICD-10-CM

## 2024-02-19 LAB
ABSOLUTE IMMATURE GRANULOCYTE: 0.04 K/UL (ref 0–0.58)
ALBUMIN SERPL-MCNC: 4.3 G/DL (ref 3.5–5.2)
ALP BLD-CCNC: 84 U/L (ref 40–129)
ALT SERPL-CCNC: 29 U/L (ref 0–40)
ANION GAP SERPL CALCULATED.3IONS-SCNC: 13 MMOL/L (ref 7–16)
AST SERPL-CCNC: 38 U/L (ref 0–39)
BASOPHILS ABSOLUTE: 0.07 K/UL (ref 0–0.2)
BASOPHILS RELATIVE PERCENT: 1 % (ref 0–2)
BILIRUB SERPL-MCNC: 0.7 MG/DL (ref 0–1.2)
BUN BLDV-MCNC: 14 MG/DL (ref 6–23)
CALCIUM SERPL-MCNC: 9.6 MG/DL (ref 8.6–10.2)
CHLORIDE BLD-SCNC: 102 MMOL/L (ref 98–107)
CHOLESTEROL: 183 MG/DL
CO2: 25 MMOL/L (ref 22–29)
CREAT SERPL-MCNC: 1 MG/DL (ref 0.7–1.2)
EOSINOPHILS ABSOLUTE: 0.31 K/UL (ref 0.05–0.5)
EOSINOPHILS RELATIVE PERCENT: 5 % (ref 0–6)
GFR SERPL CREATININE-BSD FRML MDRD: >60 ML/MIN/1.73M2
GLUCOSE BLD-MCNC: 97 MG/DL (ref 74–99)
HCT VFR BLD CALC: 54.9 % (ref 37–54)
HDLC SERPL-MCNC: 38 MG/DL
HEMOGLOBIN: 17.1 G/DL (ref 12.5–16.5)
IMMATURE GRANULOCYTES: 1 % (ref 0–5)
LDL CHOLESTEROL: 125 MG/DL
LYMPHOCYTES ABSOLUTE: 1.43 K/UL (ref 1.5–4)
LYMPHOCYTES RELATIVE PERCENT: 23 % (ref 20–42)
MCH RBC QN AUTO: 28.6 PG (ref 26–35)
MCHC RBC AUTO-ENTMCNC: 31.1 G/DL (ref 32–34.5)
MCV RBC AUTO: 91.8 FL (ref 80–99.9)
MONOCYTES ABSOLUTE: 0.82 K/UL (ref 0.1–0.95)
MONOCYTES RELATIVE PERCENT: 13 % (ref 2–12)
NEUTROPHILS ABSOLUTE: 3.65 K/UL (ref 1.8–7.3)
NEUTROPHILS RELATIVE PERCENT: 58 % (ref 43–80)
PDW BLD-RTO: 14.3 % (ref 11.5–15)
PLATELET # BLD: 179 K/UL (ref 130–450)
PMV BLD AUTO: 10.4 FL (ref 7–12)
POTASSIUM SERPL-SCNC: 4.7 MMOL/L (ref 3.5–5)
PROSTATE SPECIFIC ANTIGEN: 12.14 NG/ML (ref 0–4)
RBC # BLD: 5.98 M/UL (ref 3.8–5.8)
SODIUM BLD-SCNC: 140 MMOL/L (ref 132–146)
TOTAL PROTEIN: 6.8 G/DL (ref 6.4–8.3)
TRIGL SERPL-MCNC: 102 MG/DL
VLDLC SERPL CALC-MCNC: 20 MG/DL
WBC # BLD: 6.3 K/UL (ref 4.5–11.5)

## 2024-04-01 ENCOUNTER — PATIENT MESSAGE (OUTPATIENT)
Dept: FAMILY MEDICINE CLINIC | Age: 79
End: 2024-04-01

## 2024-04-01 NOTE — TELEPHONE ENCOUNTER
From: Brad Sandoval  To: Dr. Bony Clarke  Sent: 4/1/2024 3:28 PM EDT  Subject: Evaluationrequest    Hi Doctor:    I had a prostate MRI at [facility] on Wednesday 03/27. The results can be seen on MyChart. I would so much appreciate your thoughts regarding the results. I will be seeing Dr. Carrizales on Wednesday 04/10. One of his concerns was to find out how much the prostate had enlarged since the MRI biopsy 2 years ago. Also looking for any signs of cancer.     So please give me your thoughts on the above.    Sincerely,    Fabian HILLIARD

## 2024-04-09 NOTE — TELEPHONE ENCOUNTER
Called patient.  Reviewed questions and concerns regarding MRI findings.  Defer discussion to patient and urologist.  Patient expresses reluctance to consider surgical intervention or even biopsy.

## 2024-04-09 NOTE — TELEPHONE ENCOUNTER
Last Appointment   1/31/2024  Next Appointment  7/31/2024   Brad is available all day today, he will keep his phone on if you want to reach out, he is off today.

## 2024-07-29 RX ORDER — FLUTICASONE PROPIONATE AND SALMETEROL 100; 50 UG/1; UG/1
POWDER RESPIRATORY (INHALATION)
Qty: 180 EACH | Refills: 2 | Status: SHIPPED | OUTPATIENT
Start: 2024-07-29

## 2024-10-26 SDOH — ECONOMIC STABILITY: FOOD INSECURITY: WITHIN THE PAST 12 MONTHS, YOU WORRIED THAT YOUR FOOD WOULD RUN OUT BEFORE YOU GOT MONEY TO BUY MORE.: NEVER TRUE

## 2024-10-26 SDOH — HEALTH STABILITY: PHYSICAL HEALTH: ON AVERAGE, HOW MANY MINUTES DO YOU ENGAGE IN EXERCISE AT THIS LEVEL?: 30 MIN

## 2024-10-26 SDOH — ECONOMIC STABILITY: INCOME INSECURITY: HOW HARD IS IT FOR YOU TO PAY FOR THE VERY BASICS LIKE FOOD, HOUSING, MEDICAL CARE, AND HEATING?: NOT VERY HARD

## 2024-10-26 SDOH — ECONOMIC STABILITY: FOOD INSECURITY: WITHIN THE PAST 12 MONTHS, THE FOOD YOU BOUGHT JUST DIDN'T LAST AND YOU DIDN'T HAVE MONEY TO GET MORE.: NEVER TRUE

## 2024-10-26 SDOH — HEALTH STABILITY: PHYSICAL HEALTH: ON AVERAGE, HOW MANY DAYS PER WEEK DO YOU ENGAGE IN MODERATE TO STRENUOUS EXERCISE (LIKE A BRISK WALK)?: 4 DAYS

## 2024-10-26 ASSESSMENT — LIFESTYLE VARIABLES
HOW OFTEN DO YOU HAVE A DRINK CONTAINING ALCOHOL: MONTHLY OR LESS
HOW OFTEN DO YOU HAVE A DRINK CONTAINING ALCOHOL: 2
HOW OFTEN DO YOU HAVE SIX OR MORE DRINKS ON ONE OCCASION: 1
HOW MANY STANDARD DRINKS CONTAINING ALCOHOL DO YOU HAVE ON A TYPICAL DAY: PATIENT DOES NOT DRINK
HOW MANY STANDARD DRINKS CONTAINING ALCOHOL DO YOU HAVE ON A TYPICAL DAY: 0

## 2024-10-26 ASSESSMENT — PATIENT HEALTH QUESTIONNAIRE - PHQ9
SUM OF ALL RESPONSES TO PHQ QUESTIONS 1-9: 0
1. LITTLE INTEREST OR PLEASURE IN DOING THINGS: NOT AT ALL
2. FEELING DOWN, DEPRESSED OR HOPELESS: NOT AT ALL
SUM OF ALL RESPONSES TO PHQ QUESTIONS 1-9: 0
SUM OF ALL RESPONSES TO PHQ9 QUESTIONS 1 & 2: 0
SUM OF ALL RESPONSES TO PHQ QUESTIONS 1-9: 0
SUM OF ALL RESPONSES TO PHQ QUESTIONS 1-9: 0

## 2024-10-29 ENCOUNTER — OFFICE VISIT (OUTPATIENT)
Dept: FAMILY MEDICINE CLINIC | Age: 79
End: 2024-10-29

## 2024-10-29 VITALS
BODY MASS INDEX: 25.34 KG/M2 | WEIGHT: 177 LBS | DIASTOLIC BLOOD PRESSURE: 62 MMHG | RESPIRATION RATE: 18 BRPM | HEIGHT: 70 IN | SYSTOLIC BLOOD PRESSURE: 116 MMHG | HEART RATE: 67 BPM | TEMPERATURE: 97.2 F | OXYGEN SATURATION: 98 %

## 2024-10-29 DIAGNOSIS — J98.4 RESTRICTIVE LUNG DISEASE: ICD-10-CM

## 2024-10-29 DIAGNOSIS — K59.09 CHRONIC CONSTIPATION WITH OVERFLOW INCONTINENCE: ICD-10-CM

## 2024-10-29 DIAGNOSIS — E78.2 MIXED HYPERLIPIDEMIA: ICD-10-CM

## 2024-10-29 DIAGNOSIS — R97.20 ELEVATED PSA: ICD-10-CM

## 2024-10-29 DIAGNOSIS — R35.1 BPH ASSOCIATED WITH NOCTURIA: ICD-10-CM

## 2024-10-29 DIAGNOSIS — Z00.00 MEDICARE ANNUAL WELLNESS VISIT, SUBSEQUENT: Primary | ICD-10-CM

## 2024-10-29 DIAGNOSIS — I10 ESSENTIAL HYPERTENSION: ICD-10-CM

## 2024-10-29 DIAGNOSIS — L03.114 CELLULITIS OF LEFT FOREARM: ICD-10-CM

## 2024-10-29 DIAGNOSIS — N40.1 BPH ASSOCIATED WITH NOCTURIA: ICD-10-CM

## 2024-10-29 RX ORDER — DOXYCYCLINE HYCLATE 100 MG
100 TABLET ORAL 2 TIMES DAILY
Qty: 10 TABLET | Refills: 0 | Status: SHIPPED | OUTPATIENT
Start: 2024-10-29 | End: 2024-11-03

## 2024-10-29 RX ORDER — ROSUVASTATIN CALCIUM 10 MG/1
10 TABLET, COATED ORAL DAILY
Qty: 90 TABLET | Refills: 1 | Status: SHIPPED | OUTPATIENT
Start: 2024-10-29

## 2024-10-29 NOTE — PATIENT INSTRUCTIONS
10,000 steps per day on a pedometer .  Order or download the FREE \"Exercise & Physical Activity: Your Everyday Guide\" from [employer]. Call 1-680.290.1099 or search [employer] online.  You need 1094-5128 mg of calcium and 9865-3637 IU of vitamin D per day. It is possible to meet your calcium requirement with diet alone, but a vitamin D supplement is usually necessary to meet this goal.  When exposed to the sun, use a sunscreen that protects against both UVA and UVB radiation with an SPF of 30 or greater. Reapply every 2 to 3 hours or after sweating, drying off with a towel, or swimming.  Always wear a seat belt when traveling in a car. Always wear a helmet when riding a bicycle or motorcycle.

## 2024-10-29 NOTE — PROGRESS NOTES
Medicare Annual Wellness Visit    Brad Sandoval is here for Medicare AWV, Health Maintenance (Declines flu vaccine), and Wound Check (Scratch on left arm)    Assessment & Plan   Medicare annual wellness visit, subsequent  Essential hypertension  Mixed hyperlipidemia  -     rosuvastatin (CRESTOR) 10 MG tablet; Take 1 tablet by mouth daily, Disp-90 tablet, R-1Normal  -     Comprehensive Metabolic Panel; Future  -     Lipid Panel; Future  Restrictive lung disease  Chronic constipation with overflow incontinence  Cellulitis of left forearm  -     doxycycline hyclate (VIBRA-TABS) 100 MG tablet; Take 1 tablet by mouth 2 times daily for 5 days, Disp-10 tablet, R-0Normal  Elevated PSA  -     PSA, Diagnostic; Future  BPH associated with nocturia     Plan  Overall doing well.  Health maintenance issues discussed.  Given his overall health and wellbeing, I think it is reasonable to continue with colorectal cancer screening as recommended by his surgeon.  We also discussed checking PSAs in light of previous elevations and ongoing BPH symptoms.    Hypertension is at goal.  Agrees to start Crestor for the elevated ASCVD risk.  Side effects discussed..  Checking labs prior to next visit    No issues with lung disease.  Denies any new shortness of breath or wheezing    Constipation issues have modestly improved.  Continue laxative use    BPH symptoms are ongoing.  He does not want to consider the TURP that was recommended by urology.  He might go back to discuss UroLift  Laceration of the left forearm with some mild cellulitic changes.  Treat as above.  Notify if worsens    Recommendations for Preventive Services Due: see orders and patient instructions/AVS.  Recommended screening schedule for the next 5-10 years is provided to the patient in written form: see Patient Instructions/AVS.     Return in 6 months (on 4/29/2025), or if symptoms worsen or fail to improve, for HTN,HLD; Medicare Annual Wellness Visit in 1 year.

## 2024-12-09 RX ORDER — LISINOPRIL 20 MG/1
20 TABLET ORAL DAILY
Qty: 90 TABLET | Refills: 3 | Status: SHIPPED | OUTPATIENT
Start: 2024-12-09

## 2024-12-09 NOTE — TELEPHONE ENCOUNTER
Name of Medication(s) Requested:  Requested Prescriptions     Pending Prescriptions Disp Refills    lisinopril (PRINIVIL;ZESTRIL) 20 MG tablet [Pharmacy Med Name: Lisinopril 20 MG Oral Tablet] 90 tablet 3     Sig: Take 1 tablet by mouth once daily       Medication is on current medication list Yes    Dosage and directions were verified? Yes    Quantity verified: 90 day supply     Pharmacy Verified?  Yes    Last Appointment:  10/29/2024    Future appts:  Future Appointments   Date Time Provider Department Center   4/29/2025  9:15 AM Bony Clarke MD [place]   11/4/2025  8:30 AM Bony Clarke MD [place] [facility]        (If no appt send self scheduling link. .REFILLAPPT)  Scheduling request sent?     [] Yes  [x] No    Does patient need updated?  [] Yes  [x] No

## 2025-02-13 ENCOUNTER — OFFICE VISIT (OUTPATIENT)
Dept: FAMILY MEDICINE CLINIC | Age: 80
End: 2025-02-13

## 2025-02-13 VITALS
TEMPERATURE: 98.7 F | RESPIRATION RATE: 18 BRPM | SYSTOLIC BLOOD PRESSURE: 118 MMHG | BODY MASS INDEX: 25.62 KG/M2 | DIASTOLIC BLOOD PRESSURE: 60 MMHG | HEIGHT: 70 IN | HEART RATE: 77 BPM | OXYGEN SATURATION: 97 % | WEIGHT: 179 LBS

## 2025-02-13 DIAGNOSIS — J10.1 INFLUENZA A: Primary | ICD-10-CM

## 2025-02-13 DIAGNOSIS — R05.9 COUGH, UNSPECIFIED TYPE: ICD-10-CM

## 2025-02-13 LAB
INFLUENZA A ANTIBODY: POSITIVE
INFLUENZA B ANTIBODY: NEGATIVE
Lab: NORMAL
PERFORMING INSTRUMENT: NORMAL
QC PASS/FAIL: NORMAL
SARS-COV-2, POC: NORMAL

## 2025-02-13 RX ORDER — OSELTAMIVIR PHOSPHATE 75 MG/1
75 CAPSULE ORAL 2 TIMES DAILY
Qty: 10 CAPSULE | Refills: 0 | Status: SHIPPED | OUTPATIENT
Start: 2025-02-13 | End: 2025-02-18

## 2025-02-13 RX ORDER — BENZONATATE 100 MG/1
CAPSULE ORAL
Qty: 30 CAPSULE | Refills: 0 | Status: SHIPPED | OUTPATIENT
Start: 2025-02-13

## 2025-02-13 NOTE — PROGRESS NOTES
25  Brad Sandoval : 1945 Sex: male  Age 79 y.o.    Subjective:  Chief Complaint   Patient presents with    Cough     Started tuesday       HPI:   Brad Sandoval , 79 y.o. male presents to the clinic for evaluation of cough x 2 days. The patient also reports fatigue, chills. The patient has taken Mucinex for symptoms. The patient reports worsening symptoms over time. The patient denies known ill exposure. Denies headache, sinus congestion, cough, sore throat, rash, and fever. Denies chest pain, abdominal pain, GUILLERMO, wheezing and nausea / vomiting / diarrhea.    ROS:   Unless otherwise stated in this report the patient's positive and negative responses for review of systems for constitutional, eyes, ENT, cardiovascular, respiratory, gastrointestinal, neurological, , musculoskeletal, and integument systems and related systems to the presenting problem are either stated in the history of present illness or were not pertinent or were negative for the symptoms and/or complaints related to the presenting medical problem.  Positives and pertinent negatives as per HPI.  All others reviewed and are negative.      PMH:     Past Medical History:   Diagnosis Date    Dyspnea on exertion     chronic; notes mostly with stairs    Elevated PSA     Sees Dr Astorga    Hypertension     Mitral valve disorder     MVP on echo 2011    Sciatica 9/15/2014    Seasonal allergies        Past Surgical History:   Procedure Laterality Date    APPENDECTOMY  age 35    COLONOSCOPY      Dr Devine       Family History   Problem Relation Age of Onset    Heart Disease Father 57    Kidney Disease Father     Cancer Mother 53    Heart Disease Brother        Medications:     Current Outpatient Medications:     oseltamivir (TAMIFLU) 75 MG capsule, Take 1 capsule by mouth 2 times daily for 5 days, Disp: 10 capsule, Rfl: 0    benzonatate (TESSALON PERLES) 100 MG capsule, 1-2 tablets every 8 hours as needed for cough, Disp: 30 capsule,

## 2025-03-17 ENCOUNTER — OFFICE VISIT (OUTPATIENT)
Dept: FAMILY MEDICINE CLINIC | Age: 80
End: 2025-03-17
Payer: MEDICARE

## 2025-03-17 VITALS
SYSTOLIC BLOOD PRESSURE: 124 MMHG | DIASTOLIC BLOOD PRESSURE: 76 MMHG | WEIGHT: 177 LBS | RESPIRATION RATE: 18 BRPM | HEIGHT: 70 IN | HEART RATE: 75 BPM | BODY MASS INDEX: 25.34 KG/M2 | OXYGEN SATURATION: 98 % | TEMPERATURE: 97.1 F

## 2025-03-17 DIAGNOSIS — R05.9 COUGH IN ADULT: ICD-10-CM

## 2025-03-17 DIAGNOSIS — H66.002 NON-RECURRENT ACUTE SUPPURATIVE OTITIS MEDIA OF LEFT EAR WITHOUT SPONTANEOUS RUPTURE OF TYMPANIC MEMBRANE: Primary | ICD-10-CM

## 2025-03-17 PROCEDURE — 3074F SYST BP LT 130 MM HG: CPT

## 2025-03-17 PROCEDURE — G8419 CALC BMI OUT NRM PARAM NOF/U: HCPCS

## 2025-03-17 PROCEDURE — G8427 DOCREV CUR MEDS BY ELIG CLIN: HCPCS

## 2025-03-17 PROCEDURE — 3078F DIAST BP <80 MM HG: CPT

## 2025-03-17 PROCEDURE — 1159F MED LIST DOCD IN RCRD: CPT

## 2025-03-17 PROCEDURE — 1036F TOBACCO NON-USER: CPT

## 2025-03-17 PROCEDURE — 1160F RVW MEDS BY RX/DR IN RCRD: CPT

## 2025-03-17 PROCEDURE — 99213 OFFICE O/P EST LOW 20 MIN: CPT

## 2025-03-17 PROCEDURE — 1123F ACP DISCUSS/DSCN MKR DOCD: CPT

## 2025-03-17 RX ORDER — DEXTROMETHORPHAN HBR, GUAIFENESIN 60; 1200 MG/1; MG/1
1 TABLET, EXTENDED RELEASE ORAL 2 TIMES DAILY PRN
Qty: 14 TABLET | Refills: 0 | Status: SHIPPED | OUTPATIENT
Start: 2025-03-17 | End: 2025-03-24

## 2025-03-17 RX ORDER — CEFDINIR 300 MG/1
300 CAPSULE ORAL EVERY 12 HOURS
Qty: 14 CAPSULE | Refills: 0 | Status: SHIPPED | OUTPATIENT
Start: 2025-03-17 | End: 2025-03-24

## 2025-03-17 NOTE — PROGRESS NOTES
father; Kidney Disease in his father.   Allergies: Patient has no known allergies.    Unless otherwise stated in this report the patient's positive and negative responses for review of systems for constitutional, eyes, ENT, cardiovascular, respiratory, gastrointestinal, neurological, , musculoskeletal, and integument systems and related systems to the presenting problem are either stated in the history of present illness or were not pertinent or were negative for the symptoms and/or complaints related to the presenting medical problem.  Positives and pertinent negatives as per HPI.  All others reviewed and are negative.    Physical Exam   VS:    Vitals:    03/17/25 0820   BP: 124/76   Pulse: 75   Resp: 18   Temp: 97.1 °F (36.2 °C)   TempSrc: Temporal   SpO2: 98%   Weight: 80.3 kg (177 lb)   Height: 1.778 m (5' 10\")     Oxygen Saturation Interpretation: Normal.    Constitutional:  Alert, development consistent with age. NAD.  Head:  NC/NT. Airway patent.  Moderate TTP over maxillary and frontal sinuses.   Ear: Bilateral external auditory ear canals are clear there is no cerumen, erythema or debris present.  Left TM is with erythema, bulge, no effusion or perforation. Right TM is without erythema, bulge, effusion, or perforation.   Nose: Bilateral turbinates are swollen.  No septal deviation.  Rhinorrhea present.  Mouth: Posterior pharynx with mild erythema and clear postnasal drip. No tonsillar hypertrophy or exudate.   Neck:  Normal ROM. Supple. No anterior cervical adenopathy noted.  Lungs: CTAB without wheezes, rales, or rhonchi.   CV:  Regular rate and rhythm, normal heart sounds, without pathological murmurs, ectopy, gallops, or rubs.  GI: Bowel sounds active x4.  Abdomen is soft nontender nondistended.  There is no guarding or rebound tenderness noted.  Skin:  Normal turgor.  Warm, dry, without visible rash.  Lymphatic: No lymphangitis or adenopathy noted.  Neurological:  Oriented.  Motor functions

## 2025-03-24 ENCOUNTER — OFFICE VISIT (OUTPATIENT)
Dept: FAMILY MEDICINE CLINIC | Age: 80
End: 2025-03-24
Payer: MEDICARE

## 2025-03-24 VITALS
HEART RATE: 80 BPM | BODY MASS INDEX: 25.34 KG/M2 | SYSTOLIC BLOOD PRESSURE: 128 MMHG | HEIGHT: 70 IN | TEMPERATURE: 97.3 F | OXYGEN SATURATION: 96 % | WEIGHT: 177 LBS | RESPIRATION RATE: 18 BRPM | DIASTOLIC BLOOD PRESSURE: 82 MMHG

## 2025-03-24 DIAGNOSIS — H61.22 IMPACTED CERUMEN, LEFT EAR: ICD-10-CM

## 2025-03-24 DIAGNOSIS — E78.2 MIXED HYPERLIPIDEMIA: ICD-10-CM

## 2025-03-24 DIAGNOSIS — H66.005 RECURRENT ACUTE SUPPURATIVE OTITIS MEDIA WITHOUT SPONTANEOUS RUPTURE OF LEFT TYMPANIC MEMBRANE: Primary | ICD-10-CM

## 2025-03-24 PROCEDURE — 3079F DIAST BP 80-89 MM HG: CPT

## 2025-03-24 PROCEDURE — 3074F SYST BP LT 130 MM HG: CPT

## 2025-03-24 PROCEDURE — G8427 DOCREV CUR MEDS BY ELIG CLIN: HCPCS

## 2025-03-24 PROCEDURE — 1160F RVW MEDS BY RX/DR IN RCRD: CPT

## 2025-03-24 PROCEDURE — 99213 OFFICE O/P EST LOW 20 MIN: CPT

## 2025-03-24 PROCEDURE — 1159F MED LIST DOCD IN RCRD: CPT

## 2025-03-24 PROCEDURE — G8419 CALC BMI OUT NRM PARAM NOF/U: HCPCS

## 2025-03-24 PROCEDURE — 1123F ACP DISCUSS/DSCN MKR DOCD: CPT

## 2025-03-24 PROCEDURE — 1036F TOBACCO NON-USER: CPT

## 2025-03-24 PROCEDURE — 69209 REMOVE IMPACTED EAR WAX UNI: CPT

## 2025-03-24 NOTE — TELEPHONE ENCOUNTER
Name of Medication(s) Requested:  Requested Prescriptions     Pending Prescriptions Disp Refills    rosuvastatin (CRESTOR) 10 MG tablet [Pharmacy Med Name: Rosuvastatin Calcium 10 MG Oral Tablet] 90 tablet 0     Sig: Take 1 tablet by mouth once daily       Medication is on current medication list Yes    Dosage and directions were verified? Yes    Quantity verified: 90 day supply     Pharmacy Verified?  Yes    Last Appointment:  10/29/2024    Future appts:  Future Appointments   Date Time Provider Department Center   4/29/2025  9:15 AM Bony Clarke MD [facility] DE[facility]   11/4/2025  8:30 AM Bony Clarke MD [facility] DE[facility]        (If no appt send self scheduling link. .REFILLAPPT)  Scheduling request sent?     [] Yes  [x] No    Does patient need updated?  [] Yes  [x] No

## 2025-03-24 NOTE — PROGRESS NOTES
Chief Complaint:   Ear Pain      History of Present Illness   Source of history provided by:  patient.    Brad Sandoval is a 79 y.o. old male presenting to the walk-in for evaluation of plugged sensation in the left ear x  few  days. Reports associated nasal congestion, rhinorrhea, and sore throat. Denies any discharge from the ear canal. Has tried taking OTC medications without relief. Denies any fever, chills, CP, SOB, abdominal pain, neck stiffness, rash, or lethargy.    Review of Systems   Unless otherwise stated in this report or unable to obtain because of the patient's clinical or mental status as evidenced by the medical record, this patients's positive and negative responses for Review of Systems, constitutional, psych, eyes, ENT, cardiovascular, respiratory, gastrointestinal, neurological, genitourinary, musculoskeletal, integument systems and systems related to the presenting problem are either stated in the preceding or were not pertinent or were negative for the symptoms and/or complaints related to the medical problem.    Past Medical History:  has a past medical history of Dyspnea on exertion, Elevated PSA, Hypertension, Mitral valve disorder, Sciatica, and Seasonal allergies.   Past Surgical History:  has a past surgical history that includes Appendectomy (age 35) and Colonoscopy (2007).  Social History:  reports that he quit smoking about 45 years ago. His smoking use included cigarettes. He started smoking about 61 years ago. He has a 16 pack-year smoking history. He has never used smokeless tobacco. He reports that he does not drink alcohol.  Family History: family history includes Cancer (age of onset: 53) in his mother; Heart Disease in his brother; Heart Disease (age of onset: 57) in his father; Kidney Disease in his father.  Allergies: Patient has no known allergies.    Physical Exam   Vital Signs:   Vitals:    03/24/25 0802   BP: 128/82   Pulse: 80   Resp: 18   Temp: 97.3 °F (36.3 °C)

## 2025-03-25 RX ORDER — ROSUVASTATIN CALCIUM 10 MG/1
10 TABLET, COATED ORAL DAILY
Qty: 90 TABLET | Refills: 0 | Status: SHIPPED | OUTPATIENT
Start: 2025-03-25

## 2025-03-26 ENCOUNTER — TELEPHONE (OUTPATIENT)
Dept: FAMILY MEDICINE CLINIC | Age: 80
End: 2025-03-26

## 2025-03-26 NOTE — TELEPHONE ENCOUNTER
Called patient back.  He was seen in walk in on 3/24 and given another antibiotic.  He is concerned about the muffled hearing.  Please see walk in encounters.

## 2025-03-26 NOTE — TELEPHONE ENCOUNTER
This is something that will best be evaluated in person if questions remain after an urgent care visit.

## 2025-03-26 NOTE — TELEPHONE ENCOUNTER
Last Appointment   10/29/2024  Next Appointment  4/29/2025    Patient requesting a return call from Dr. Clarke's nurse regarding hearing loss in his left ear. He can be reached at [phone]

## 2025-03-31 ENCOUNTER — OFFICE VISIT (OUTPATIENT)
Dept: FAMILY MEDICINE CLINIC | Age: 80
End: 2025-03-31
Payer: MEDICARE

## 2025-03-31 VITALS
TEMPERATURE: 97.9 F | BODY MASS INDEX: 25.48 KG/M2 | SYSTOLIC BLOOD PRESSURE: 136 MMHG | HEIGHT: 70 IN | DIASTOLIC BLOOD PRESSURE: 82 MMHG | HEART RATE: 67 BPM | RESPIRATION RATE: 18 BRPM | OXYGEN SATURATION: 100 % | WEIGHT: 178 LBS

## 2025-03-31 DIAGNOSIS — H91.92 HEARING LOSS OF LEFT EAR, UNSPECIFIED HEARING LOSS TYPE: Primary | ICD-10-CM

## 2025-03-31 PROCEDURE — 3075F SYST BP GE 130 - 139MM HG: CPT

## 2025-03-31 PROCEDURE — 3079F DIAST BP 80-89 MM HG: CPT

## 2025-03-31 PROCEDURE — 1036F TOBACCO NON-USER: CPT

## 2025-03-31 PROCEDURE — 1123F ACP DISCUSS/DSCN MKR DOCD: CPT

## 2025-03-31 PROCEDURE — G8427 DOCREV CUR MEDS BY ELIG CLIN: HCPCS

## 2025-03-31 PROCEDURE — G8419 CALC BMI OUT NRM PARAM NOF/U: HCPCS

## 2025-03-31 PROCEDURE — 1159F MED LIST DOCD IN RCRD: CPT

## 2025-03-31 PROCEDURE — 99213 OFFICE O/P EST LOW 20 MIN: CPT

## 2025-03-31 SDOH — ECONOMIC STABILITY: FOOD INSECURITY: WITHIN THE PAST 12 MONTHS, THE FOOD YOU BOUGHT JUST DIDN'T LAST AND YOU DIDN'T HAVE MONEY TO GET MORE.: NEVER TRUE

## 2025-03-31 SDOH — ECONOMIC STABILITY: FOOD INSECURITY: WITHIN THE PAST 12 MONTHS, YOU WORRIED THAT YOUR FOOD WOULD RUN OUT BEFORE YOU GOT MONEY TO BUY MORE.: NEVER TRUE

## 2025-03-31 ASSESSMENT — PATIENT HEALTH QUESTIONNAIRE - PHQ9
2. FEELING DOWN, DEPRESSED OR HOPELESS: NOT AT ALL
SUM OF ALL RESPONSES TO PHQ QUESTIONS 1-9: 0
1. LITTLE INTEREST OR PLEASURE IN DOING THINGS: NOT AT ALL
SUM OF ALL RESPONSES TO PHQ QUESTIONS 1-9: 0

## 2025-03-31 ASSESSMENT — ENCOUNTER SYMPTOMS
RECTAL PAIN: 0
CHEST TIGHTNESS: 0
ABDOMINAL PAIN: 0
DIARRHEA: 0
NAUSEA: 0
TROUBLE SWALLOWING: 0
WHEEZING: 0
SHORTNESS OF BREATH: 0
ABDOMINAL DISTENTION: 0
COUGH: 0

## 2025-03-31 NOTE — PROGRESS NOTES
[facility]  Precepting Note    Subjective:  Left ear fullness x 3-4 weeks  Took antibiotics  Now having hearing loss in left ear    ROS otherwise negative     Past medical, surgical, family and social history were reviewed, non-contributory, and unchanged unless otherwise stated.    Objective:    /82   Pulse 67   Temp 97.9 °F (36.6 °C) (Temporal)   Resp 18   Ht 1.778 m (5' 10\")   Wt 80.7 kg (178 lb)   SpO2 100%   BMI 25.54 kg/m²     Exam is as noted by resident     Assessment/Plan:  L ear hearing loss  Refer to ENT  Tympanometry showed findings of possible conductive hearing loss     Attending Physician Statement  I have reviewed the chart, including any radiology or labs. I have discussed the case, including pertinent history and exam findings with the resident.  I agree with the assessment, plan and orders as documented by the resident.  Please refer to the resident note for additional information.      Electronically signed by JACQUELINE ELLISON MD on 3/31/2025 at 1:34 PM

## 2025-03-31 NOTE — PROGRESS NOTES
[facility]  Department of Family Medicine  Family Medicine Residency Program      Patient: Brad Sandoval 79 y.o. male     Date of Service: 3/31/25      Chief complaint:   Chief Complaint   Patient presents with    Hearing Problem     Antibiotic given, did not resolve  Left ear, unable to hear       HISTORY OF PRESENTING ILLNESS     79 y.o. male presented to the clinic for an acute care visit for ear pain and hearing loss. Has lost hearing in left ear after flu 3 weeks ago, got antibiotic (Augmentin) on 3/24/25 without improvement and omnicef prior to this.       Health Maintenance:  Health Maintenance Due   Topic Date Due    Hepatitis C screen  Never done    DTaP/Tdap/Td vaccine (1 - Tdap) Never done    Shingles vaccine (2 of 3) 10/30/2012    Respiratory Syncytial Virus (RSV) Pregnant or age 60 yrs+ (1 - 1-dose 75+ series) Never done    Flu vaccine (1) 08/01/2024    COVID-19 Vaccine (3 - 2024-25 season) 09/01/2024    Lipids  02/19/2025    Prostate Specific Antigen (PSA) Screening or Monitoring  02/19/2025     Past Medical History:      Diagnosis Date    Dyspnea on exertion     chronic; notes mostly with stairs    Elevated PSA     Sees Dr Astorga    Hypertension     Mitral valve disorder     MVP on echo 8/2011    Sciatica 9/15/2014    Seasonal allergies      Past Surgical History:        Procedure Laterality Date    APPENDECTOMY  age 35    COLONOSCOPY  2007    Dr Devine     Allergies:    Patient has no known allergies.  Social History:   Social History     Socioeconomic History    Marital status:      Spouse name: Not on file    Number of children: Not on file    Years of education: Not on file    Highest education level: Not on file   Occupational History    Not on file   Tobacco Use    Smoking status: Former     Current packs/day: 0.00     Average packs/day: 1 pack/day for 16.0 years (16.0 ttl pk-yrs)     Types: Cigarettes     Start date: 1/1/1964     Quit date: 1/1/1980

## 2025-04-02 ENCOUNTER — PROCEDURE VISIT (OUTPATIENT)
Dept: AUDIOLOGY | Age: 80
End: 2025-04-02
Payer: MEDICARE

## 2025-04-02 ENCOUNTER — OFFICE VISIT (OUTPATIENT)
Dept: ENT CLINIC | Age: 80
End: 2025-04-02
Payer: MEDICARE

## 2025-04-02 ENCOUNTER — TELEPHONE (OUTPATIENT)
Dept: AUDIOLOGY | Age: 80
End: 2025-04-02

## 2025-04-02 VITALS
HEART RATE: 91 BPM | TEMPERATURE: 97.3 F | OXYGEN SATURATION: 96 % | HEIGHT: 70 IN | BODY MASS INDEX: 25.56 KG/M2 | SYSTOLIC BLOOD PRESSURE: 160 MMHG | DIASTOLIC BLOOD PRESSURE: 82 MMHG | WEIGHT: 178.5 LBS

## 2025-04-02 DIAGNOSIS — H90.12 CONDUCTIVE HEARING LOSS OF LEFT EAR, UNSPECIFIED HEARING STATUS ON CONTRALATERAL SIDE: ICD-10-CM

## 2025-04-02 DIAGNOSIS — H69.93 EUSTACHIAN TUBE DYSFUNCTION, BILATERAL: ICD-10-CM

## 2025-04-02 DIAGNOSIS — H66.002 ACUTE SUPPURATIVE OTITIS MEDIA OF LEFT EAR WITHOUT SPONTANEOUS RUPTURE OF TYMPANIC MEMBRANE, RECURRENCE NOT SPECIFIED: Primary | ICD-10-CM

## 2025-04-02 DIAGNOSIS — H65.92 MIDDLE EAR EFFUSION, LEFT: Primary | ICD-10-CM

## 2025-04-02 PROCEDURE — 3079F DIAST BP 80-89 MM HG: CPT | Performed by: OTOLARYNGOLOGY

## 2025-04-02 PROCEDURE — 92567 TYMPANOMETRY: CPT | Performed by: AUDIOLOGIST

## 2025-04-02 PROCEDURE — 3077F SYST BP >= 140 MM HG: CPT | Performed by: OTOLARYNGOLOGY

## 2025-04-02 PROCEDURE — 99204 OFFICE O/P NEW MOD 45 MIN: CPT | Performed by: OTOLARYNGOLOGY

## 2025-04-02 PROCEDURE — G8427 DOCREV CUR MEDS BY ELIG CLIN: HCPCS | Performed by: OTOLARYNGOLOGY

## 2025-04-02 PROCEDURE — 1036F TOBACCO NON-USER: CPT | Performed by: OTOLARYNGOLOGY

## 2025-04-02 PROCEDURE — G8419 CALC BMI OUT NRM PARAM NOF/U: HCPCS | Performed by: OTOLARYNGOLOGY

## 2025-04-02 PROCEDURE — 1123F ACP DISCUSS/DSCN MKR DOCD: CPT | Performed by: OTOLARYNGOLOGY

## 2025-04-02 PROCEDURE — 1159F MED LIST DOCD IN RCRD: CPT | Performed by: OTOLARYNGOLOGY

## 2025-04-02 RX ORDER — PREDNISONE 20 MG/1
40 TABLET ORAL DAILY
Qty: 14 TABLET | Refills: 0 | Status: SHIPPED | OUTPATIENT
Start: 2025-04-02 | End: 2025-04-09

## 2025-04-02 ASSESSMENT — ENCOUNTER SYMPTOMS
SINUS PRESSURE: 0
COUGH: 0
WHEEZING: 0
SORE THROAT: 0
RHINORRHEA: 0
SINUS PAIN: 0
STRIDOR: 0
CHOKING: 0
SHORTNESS OF BREATH: 0

## 2025-04-02 NOTE — PROGRESS NOTES
This patient was referred for tympanometric testing by Dr. Melendez due to ear fullness and pressure, left ear, that began when patient was diagnosed with the flu, 3 weeks ago.  Patient did take antibiotics and Tamiflu, but the symptoms have not resolved.    Tympanometry revealed normal middle ear peak pressure and compliance, right ear and no middle ear peak pressure, left ear.    The results were reviewed with the patient and ordering provider.     Recommendations for follow up will be made pending ordering provider consult.    Chacotra Amaya CCC/YUDY  Audiologist  [id number]  NPI#:  [id number]      Electronically signed by Amy Gonzalez on 4/2/2025 at 8:43 AM

## 2025-04-02 NOTE — PROGRESS NOTES
Department of Otolaryngology  Office Consult Note  4/2/25          Subjective:        Chief Complaint:  had concerns including New Patient ( NEW PATIENT - NP-possible sudden hearing loss; referred by Ronald).     Patient ID: Brad Sandoval is a 79 y.o. male.    HPI: Patient presents as  new patient for sudden hearing out of the left ear. Patient had onset of influenza 4 weeks ago, took tamiflu with improvement but had onset of hearing loss afterwards as his infection was clearing. Left muffled hearing persisted, given antibiotics x2 with no change. Still cannot hear out of left ear. No previous similar hx. No hxof hearing loss prior to illness.  No otorrhea, otalgia, dizziness.   No nasal congestion now  Was using zyrtec and flonase after inlfuenza for lingering congestion and rhinorrhea   Denies loud noise exposure.  Denies family history of hearing loss.  Denies vertigo.    Review of Systems   Constitutional: Negative.    HENT:  Positive for hearing loss. Negative for congestion, ear discharge, ear pain, rhinorrhea, sinus pressure, sinus pain, sore throat and tinnitus.    Respiratory:  Negative for cough, choking, shortness of breath, wheezing and stridor.    Cardiovascular:  Negative for chest pain.   Skin:  Negative for rash.   Allergic/Immunologic: Negative for environmental allergies and immunocompromised state.   Neurological:  Negative for dizziness, weakness, light-headedness and headaches.   Psychiatric/Behavioral:  Negative for confusion.    All other systems reviewed and are negative.        Past Medical History:   Diagnosis Date    Dyspnea on exertion     chronic; notes mostly with stairs    Elevated PSA     Sees Dr Astorga    Hypertension     Mitral valve disorder     MVP on echo 8/2011    Sciatica 9/15/2014    Seasonal allergies      Past Surgical History:   Procedure Laterality Date    APPENDECTOMY  age 35    COLONOSCOPY  2007    Dr Devine       Current Outpatient Medications:     predniSONE

## 2025-04-02 NOTE — TELEPHONE ENCOUNTER
Referral for Hearing Evaluation received.  Called patient and left a voicemail for the patient to call back for scheduling.    Electronically signed by Amy Narayan on 4/2/25 at 9:57 AM EDT

## 2025-04-21 ENCOUNTER — RESULTS FOLLOW-UP (OUTPATIENT)
Dept: FAMILY MEDICINE CLINIC | Age: 80
End: 2025-04-21

## 2025-04-21 LAB
A/G RATIO: 1.5 RATIO (ref 1.1–2.2)
ALBUMIN: 4.3 G/DL (ref 3.5–5)
ALP BLD-CCNC: 82 U/L (ref 42–121)
ALT SERPL-CCNC: 25 U/L (ref 7–52)
ANION GAP SERPL CALCULATED.3IONS-SCNC: 4 MEQ/L (ref 4–14)
AST SERPL-CCNC: 22 U/L (ref 10–41)
BILIRUB SERPL-MCNC: 0.8 MG/DL (ref 0.3–1.5)
BUN BLDV-MCNC: 14 MG/DL (ref 7–25)
CALCIUM SERPL-MCNC: 9.5 MG/DL (ref 8.5–10.5)
CHLORIDE BLD-SCNC: 103 MEQ/L (ref 98–107)
CHOLESTEROL, TOTAL: 134 MG/DL (ref 0–199)
CO2: 30 MEQ/L (ref 21–31)
CREAT SERPL-MCNC: 0.91 MG/DL (ref 0.7–1.3)
CREATININE + EGFR PANEL: 97 ML/MIN
DIAGNOSTIC PSA: 18.36 NG/ML (ref 0–4)
GFR NON-AFRICAN AMERICAN: 80 ML/MIN
GLOBULIN: 2.8 G/DL (ref 1.9–3.9)
GLUCOSE BLD-MCNC: 99 MG/DL (ref 70–99)
HDLC SERPL-MCNC: 42 MG/DL
LDL CHOLESTEROL: 93 MG/DL (ref 0–99)
LDL/HDL RATIO: 2.2 RATIO
POTASSIUM SERPL-SCNC: 4 MEQ/L (ref 3.6–5)
SODIUM BLD-SCNC: 137 MEQ/L (ref 135–145)
TOTAL PROTEIN: 7.1 G/DL (ref 6.2–8)
TRIGL SERPL-MCNC: 82 MG/DL (ref 0–149)

## 2025-04-28 NOTE — PROGRESS NOTES
Denies blood in the stool    BPH with urinary symptoms along with elevated PSA  Previously a TURP was recommended but he wanted to hold off.  States was told he had no urinary residual   States UroLift was not discussed.  He continues to have unpredictable urinary incontinence but it does seem to have a bit of a urge component to it.  He has 3+ episodes of nocturia  Denies dysuria  He compensates by wearing adult pull-ups.  He has chronically elevated PSA.  Lab Results   Component Value Date    PSA 18.36 (H) 04/21/2025           Review of Systems   Constitutional:  Negative for fatigue.   HENT:  Positive for hearing loss.    Respiratory:  Negative for cough and shortness of breath.    Cardiovascular:  Negative for chest pain, palpitations and leg swelling.   Gastrointestinal:  Negative for abdominal pain.   Genitourinary:  Negative for difficulty urinating.            Objective   BP 96/66   Pulse 80   Temp 97.6 °F (36.4 °C) (Temporal)   Resp 16   Ht 1.778 m (5' 10\")   Wt 80.3 kg (177 lb)   SpO2 98%   BMI 25.40 kg/m²     Physical Exam  Vitals reviewed.   Constitutional:       General: He is not in acute distress.     Appearance: Normal appearance.   HENT:      Head: Normocephalic and atraumatic.      Right Ear: Tympanic membrane normal. No middle ear effusion.      Left Ear: A middle ear effusion (serous) is present.      Nose:      Left Turbinates: Swollen and pale.      Mouth/Throat:      Mouth: Mucous membranes are moist.   Eyes:      Conjunctiva/sclera: Conjunctivae normal.   Cardiovascular:      Rate and Rhythm: Normal rate and regular rhythm.      Pulses: Normal pulses.      Heart sounds: No murmur heard.  Pulmonary:      Breath sounds: Normal breath sounds. No wheezing, rhonchi or rales.   Abdominal:      General: Bowel sounds are normal. There is no distension.      Palpations: Abdomen is soft.      Tenderness: There is no abdominal tenderness.   Musculoskeletal:      Cervical back: Neck supple.

## 2025-04-29 ENCOUNTER — OFFICE VISIT (OUTPATIENT)
Dept: FAMILY MEDICINE CLINIC | Age: 80
End: 2025-04-29

## 2025-04-29 VITALS
BODY MASS INDEX: 25.34 KG/M2 | TEMPERATURE: 97.6 F | WEIGHT: 177 LBS | RESPIRATION RATE: 16 BRPM | OXYGEN SATURATION: 98 % | DIASTOLIC BLOOD PRESSURE: 66 MMHG | HEART RATE: 80 BPM | SYSTOLIC BLOOD PRESSURE: 96 MMHG | HEIGHT: 70 IN

## 2025-04-29 DIAGNOSIS — R35.1 BPH ASSOCIATED WITH NOCTURIA: ICD-10-CM

## 2025-04-29 DIAGNOSIS — N40.1 BPH ASSOCIATED WITH NOCTURIA: ICD-10-CM

## 2025-04-29 DIAGNOSIS — R97.20 ELEVATED PSA: ICD-10-CM

## 2025-04-29 DIAGNOSIS — R09.81 NASAL CONGESTION: Primary | ICD-10-CM

## 2025-04-29 DIAGNOSIS — E78.2 MIXED HYPERLIPIDEMIA: ICD-10-CM

## 2025-04-29 DIAGNOSIS — H91.92 HEARING LOSS OF LEFT EAR, UNSPECIFIED HEARING LOSS TYPE: ICD-10-CM

## 2025-04-29 DIAGNOSIS — H65.92 RECURRENT SEROUS OTITIS MEDIA, LEFT: ICD-10-CM

## 2025-04-29 DIAGNOSIS — J98.4 RESTRICTIVE LUNG DISEASE: ICD-10-CM

## 2025-04-29 DIAGNOSIS — I10 ESSENTIAL HYPERTENSION: ICD-10-CM

## 2025-04-29 RX ORDER — MONTELUKAST SODIUM 10 MG/1
10 TABLET ORAL NIGHTLY
Qty: 30 TABLET | Refills: 5 | Status: SHIPPED | OUTPATIENT
Start: 2025-04-29

## 2025-04-29 RX ORDER — ALBUTEROL SULFATE 90 UG/1
2 INHALANT RESPIRATORY (INHALATION) EVERY 6 HOURS PRN
Qty: 1 EACH | Refills: 5 | Status: SHIPPED | OUTPATIENT
Start: 2025-04-29

## 2025-04-29 ASSESSMENT — ENCOUNTER SYMPTOMS
SHORTNESS OF BREATH: 0
ABDOMINAL PAIN: 0
COUGH: 0

## 2025-04-29 NOTE — ASSESSMENT & PLAN NOTE
Chronic issue.  Doing well at the present time.  No changes to regimen recommended.  Refill albuterol as below.  He indicates his pharmacy formulary does no longer cover Advair.  He says he has several months worth, and he will let us know when he is running out.  He is unaware of alternatives    Orders:    albuterol sulfate HFA (PROAIR HFA) 108 (90 Base) MCG/ACT inhaler; Inhale 2 puffs into the lungs every 6 hours as needed for Wheezing or Shortness of Breath

## 2025-05-12 ENCOUNTER — PROCEDURE VISIT (OUTPATIENT)
Dept: AUDIOLOGY | Age: 80
End: 2025-05-12
Payer: MEDICARE

## 2025-05-12 ENCOUNTER — OFFICE VISIT (OUTPATIENT)
Dept: ENT CLINIC | Age: 80
End: 2025-05-12
Payer: MEDICARE

## 2025-05-12 VITALS
HEIGHT: 70 IN | BODY MASS INDEX: 25.34 KG/M2 | DIASTOLIC BLOOD PRESSURE: 70 MMHG | TEMPERATURE: 97.5 F | RESPIRATION RATE: 16 BRPM | OXYGEN SATURATION: 94 % | HEART RATE: 71 BPM | SYSTOLIC BLOOD PRESSURE: 113 MMHG | WEIGHT: 177 LBS

## 2025-05-12 DIAGNOSIS — H65.92 MIDDLE EAR EFFUSION, LEFT: ICD-10-CM

## 2025-05-12 DIAGNOSIS — H90.3 SENSORINEURAL HEARING LOSS (SNHL) OF BOTH EARS: ICD-10-CM

## 2025-05-12 DIAGNOSIS — H69.93 EUSTACHIAN TUBE DYSFUNCTION, BILATERAL: Primary | ICD-10-CM

## 2025-05-12 DIAGNOSIS — H90.3 SENSORINEURAL HEARING LOSS (SNHL) OF BOTH EARS: Primary | ICD-10-CM

## 2025-05-12 PROCEDURE — 92557 COMPREHENSIVE HEARING TEST: CPT | Performed by: AUDIOLOGIST

## 2025-05-12 PROCEDURE — 1159F MED LIST DOCD IN RCRD: CPT | Performed by: OTOLARYNGOLOGY

## 2025-05-12 PROCEDURE — 92567 TYMPANOMETRY: CPT | Performed by: AUDIOLOGIST

## 2025-05-12 PROCEDURE — 1036F TOBACCO NON-USER: CPT | Performed by: OTOLARYNGOLOGY

## 2025-05-12 PROCEDURE — 1123F ACP DISCUSS/DSCN MKR DOCD: CPT | Performed by: OTOLARYNGOLOGY

## 2025-05-12 PROCEDURE — G8419 CALC BMI OUT NRM PARAM NOF/U: HCPCS | Performed by: OTOLARYNGOLOGY

## 2025-05-12 PROCEDURE — 3074F SYST BP LT 130 MM HG: CPT | Performed by: OTOLARYNGOLOGY

## 2025-05-12 PROCEDURE — 99213 OFFICE O/P EST LOW 20 MIN: CPT | Performed by: OTOLARYNGOLOGY

## 2025-05-12 PROCEDURE — G8427 DOCREV CUR MEDS BY ELIG CLIN: HCPCS | Performed by: OTOLARYNGOLOGY

## 2025-05-12 PROCEDURE — 3078F DIAST BP <80 MM HG: CPT | Performed by: OTOLARYNGOLOGY

## 2025-05-12 ASSESSMENT — ENCOUNTER SYMPTOMS
SINUS PAIN: 0
STRIDOR: 0
COUGH: 0
SINUS PRESSURE: 0
CHOKING: 0
WHEEZING: 0
SHORTNESS OF BREATH: 0
RHINORRHEA: 0
SORE THROAT: 0

## 2025-05-12 NOTE — PROGRESS NOTES
Department of Otolaryngology  Office Consult Note          Subjective:        Chief Complaint:  had concerns including Follow-up (1 month follow up middle ear effusion possible myringotomy ).     Patient ID: Brad Sandoval is a 79 y.o. male.    HPI: Patient presents as  follow-up for sudden hearing out of the left ear from left middle ear effusion. Feeling hearing improved after prednisone flonase and singulair.  He still has hearing loss but is much improved and feels it is back to baseline.    Review of Systems   Constitutional: Negative.    HENT:  Positive for hearing loss. Negative for congestion, ear discharge, ear pain, rhinorrhea, sinus pressure, sinus pain, sore throat and tinnitus.    Respiratory:  Negative for cough, choking, shortness of breath, wheezing and stridor.    Cardiovascular:  Negative for chest pain.   Skin:  Negative for rash.   Allergic/Immunologic: Negative for environmental allergies and immunocompromised state.   Neurological:  Negative for dizziness, weakness, light-headedness and headaches.   Psychiatric/Behavioral:  Negative for confusion.    All other systems reviewed and are negative.        Past Medical History:   Diagnosis Date    Dyspnea on exertion     chronic; notes mostly with stairs    Elevated PSA     Sees Dr Astorga    Hypertension     Mitral valve disorder     MVP on echo 8/2011    Sciatica 9/15/2014    Seasonal allergies      Past Surgical History:   Procedure Laterality Date    APPENDECTOMY  age 35    COLONOSCOPY  2007    Dr Devine       Current Outpatient Medications:     albuterol sulfate HFA (PROAIR HFA) 108 (90 Base) MCG/ACT inhaler, Inhale 2 puffs into the lungs every 6 hours as needed for Wheezing or Shortness of Breath, Disp: 1 each, Rfl: 5    montelukast (SINGULAIR) 10 MG tablet, Take 1 tablet by mouth nightly, Disp: 30 tablet, Rfl: 5    rosuvastatin (CRESTOR) 10 MG tablet, Take 1 tablet by mouth once daily, Disp: 90 tablet, Rfl: 0    lisinopril

## 2025-05-12 NOTE — PROGRESS NOTES
This patient was referred for audiometric/tympanometric testing by Dr. Melendez due to hearing loss.      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   Reliability was good. Speech reception thresholds were in good agreement with the pure tone averages, bilaterally. Speech discrimination scores were excellent, bilaterally.    Tympanometry revealed normal middle ear peak pressure and compliance, bilaterally.        The results were reviewed with the patient and physician.     Recommendations for follow up will be made pending ordering provider consult.    Amy Schmidt/CCC-A  [place] Lic # [id number]

## 2025-07-31 ENCOUNTER — OFFICE VISIT (OUTPATIENT)
Dept: FAMILY MEDICINE CLINIC | Age: 80
End: 2025-07-31

## 2025-07-31 VITALS
HEART RATE: 83 BPM | SYSTOLIC BLOOD PRESSURE: 110 MMHG | RESPIRATION RATE: 18 BRPM | OXYGEN SATURATION: 95 % | TEMPERATURE: 98.6 F | HEIGHT: 70 IN | DIASTOLIC BLOOD PRESSURE: 64 MMHG | WEIGHT: 177 LBS | BODY MASS INDEX: 25.34 KG/M2

## 2025-07-31 DIAGNOSIS — R68.89 FLU-LIKE SYMPTOMS: ICD-10-CM

## 2025-07-31 DIAGNOSIS — L03.116 CELLULITIS OF LEFT LOWER EXTREMITY: Primary | ICD-10-CM

## 2025-07-31 LAB
INFLUENZA A ANTIBODY: NEGATIVE
INFLUENZA B ANTIBODY: NEGATIVE
Lab: NORMAL
PERFORMING INSTRUMENT: NORMAL
QC PASS/FAIL: NORMAL
S PYO AG THROAT QL: NORMAL
SARS-COV-2, POC: NORMAL

## 2025-07-31 RX ORDER — DOXYCYCLINE 100 MG/1
100 CAPSULE ORAL 2 TIMES DAILY
Qty: 20 CAPSULE | Refills: 0 | Status: SHIPPED | OUTPATIENT
Start: 2025-07-31 | End: 2025-08-10

## 2025-07-31 NOTE — PROGRESS NOTES
medications, and past medical history have been reviewed.  Brad \"Fabian\" was seen today for generalized body aches and fatigue.    Diagnoses and all orders for this visit:    Cellulitis of left lower extremity  -     doxycycline hyclate (VIBRAMYCIN) 100 MG capsule; Take 1 capsule by mouth 2 times daily for 10 days    Flu-like symptoms  -     POCT COVID-19, Antigen  -     POCT Influenza A/B  -     POCT rapid strep A        - Disposition: Home    - Educational material printed for patient's review and were included in patient instructions. After Visit Summary was given to patient at the end of visit.    - Encouraged oral fluids and rest. Discussed symptomatic treatments with patient today. The patient is to follow-up with PCP in the next 2-3 days for reevaluation. Red flag symptoms were also discussed with the patient today. If symptoms worsen the patient is to go directly to the emergency department for reevaluation and treatment. Pt verbalizes understanding and is in agreement with plan of care. All questions answered.      SIGNATURE: Enoch Dunn, APRN-CNP    *NOTE: This report was transcribed using voice recognition software. Every effort was made to ensure accuracy; however, inadvertent computerized transcription errors may be present.

## 2025-08-05 ENCOUNTER — OFFICE VISIT (OUTPATIENT)
Dept: FAMILY MEDICINE CLINIC | Age: 80
End: 2025-08-05

## 2025-08-05 VITALS
SYSTOLIC BLOOD PRESSURE: 106 MMHG | HEART RATE: 71 BPM | DIASTOLIC BLOOD PRESSURE: 66 MMHG | BODY MASS INDEX: 24.91 KG/M2 | OXYGEN SATURATION: 98 % | HEIGHT: 70 IN | TEMPERATURE: 97.4 F | RESPIRATION RATE: 16 BRPM | WEIGHT: 174 LBS

## 2025-08-05 DIAGNOSIS — L30.9 DERMATITIS: Primary | ICD-10-CM

## 2025-08-05 RX ORDER — TRIAMCINOLONE ACETONIDE 1 MG/G
CREAM TOPICAL
Qty: 45 G | Refills: 1 | Status: SHIPPED | OUTPATIENT
Start: 2025-08-05 | End: 2025-08-19